# Patient Record
Sex: MALE | Race: WHITE | NOT HISPANIC OR LATINO | Employment: OTHER | URBAN - METROPOLITAN AREA
[De-identification: names, ages, dates, MRNs, and addresses within clinical notes are randomized per-mention and may not be internally consistent; named-entity substitution may affect disease eponyms.]

---

## 2017-01-16 ENCOUNTER — TELEPHONE (OUTPATIENT)
Dept: FAMILY MEDICINE | Facility: CLINIC | Age: 72
End: 2017-01-16

## 2017-01-16 NOTE — TELEPHONE ENCOUNTER
----- Message from Carolyn Gomez sent at 1/16/2017 11:43 AM CST -----  Contact: Ashley Matias (Spouse)  Ashley Matias (Spouse) returning a missed call to reschedule appt. First avail appt late March, but the patient wants to be seen in Feb. Please advise.  Call back .  Thanks!

## 2017-02-08 RX ORDER — TRAMADOL HYDROCHLORIDE 50 MG/1
50 TABLET ORAL EVERY 6 HOURS PRN
Qty: 60 TABLET | Refills: 0 | Status: SHIPPED | OUTPATIENT
Start: 2017-02-08 | End: 2017-02-18

## 2017-02-08 NOTE — TELEPHONE ENCOUNTER
----- Message from Shiela Hickman sent at 2/8/2017  1:09 PM CST -----  Contact: anupama   Back pain, went to ER Sunday   Wants medication refilled   tramidol 50  [facility] [address]  Call back

## 2017-02-22 ENCOUNTER — OFFICE VISIT (OUTPATIENT)
Dept: FAMILY MEDICINE | Facility: CLINIC | Age: 72
End: 2017-02-22
Payer: COMMERCIAL

## 2017-02-22 VITALS
WEIGHT: 173.5 LBS | OXYGEN SATURATION: 99 % | HEIGHT: 72 IN | BODY MASS INDEX: 23.5 KG/M2 | HEART RATE: 62 BPM | SYSTOLIC BLOOD PRESSURE: 132 MMHG | RESPIRATION RATE: 18 BRPM | DIASTOLIC BLOOD PRESSURE: 62 MMHG

## 2017-02-22 DIAGNOSIS — I10 BENIGN HYPERTENSION: ICD-10-CM

## 2017-02-22 DIAGNOSIS — R53.83 FATIGUE, UNSPECIFIED TYPE: ICD-10-CM

## 2017-02-22 DIAGNOSIS — Z12.5 SCREENING FOR PROSTATE CANCER: ICD-10-CM

## 2017-02-22 DIAGNOSIS — Z13.6 SCREENING FOR AAA (ABDOMINAL AORTIC ANEURYSM): ICD-10-CM

## 2017-02-22 DIAGNOSIS — Z11.59 NEED FOR HEPATITIS C SCREENING TEST: ICD-10-CM

## 2017-02-22 DIAGNOSIS — Z12.31 ENCOUNTER FOR SCREENING MAMMOGRAM FOR MALIGNANT NEOPLASM OF BREAST: ICD-10-CM

## 2017-02-22 DIAGNOSIS — E78.00 HYPERCHOLESTEREMIA: Chronic | ICD-10-CM

## 2017-02-22 DIAGNOSIS — R09.89 BRUIT OF LEFT CAROTID ARTERY: ICD-10-CM

## 2017-02-22 DIAGNOSIS — Z00.00 ANNUAL PHYSICAL EXAM: Primary | ICD-10-CM

## 2017-02-22 DIAGNOSIS — Z72.0 TOBACCO ABUSE: Chronic | ICD-10-CM

## 2017-02-22 DIAGNOSIS — N40.0 BENIGN NODULAR PROSTATIC HYPERPLASIA WITHOUT LOWER URINARY TRACT SYMPTOMS: ICD-10-CM

## 2017-02-22 PROCEDURE — 1157F ADVNC CARE PLAN IN RCRD: CPT | Mod: S$GLB,,, | Performed by: INTERNAL MEDICINE

## 2017-02-22 PROCEDURE — 3075F SYST BP GE 130 - 139MM HG: CPT | Mod: S$GLB,,, | Performed by: INTERNAL MEDICINE

## 2017-02-22 PROCEDURE — 1126F AMNT PAIN NOTED NONE PRSNT: CPT | Mod: S$GLB,,, | Performed by: INTERNAL MEDICINE

## 2017-02-22 PROCEDURE — 3078F DIAST BP <80 MM HG: CPT | Mod: S$GLB,,, | Performed by: INTERNAL MEDICINE

## 2017-02-22 PROCEDURE — 99214 OFFICE O/P EST MOD 30 MIN: CPT | Mod: S$GLB,,, | Performed by: INTERNAL MEDICINE

## 2017-02-22 PROCEDURE — 1159F MED LIST DOCD IN RCRD: CPT | Mod: S$GLB,,, | Performed by: INTERNAL MEDICINE

## 2017-02-22 PROCEDURE — 99999 PR PBB SHADOW E&M-EST. PATIENT-LVL IV: CPT | Mod: PBBFAC,,, | Performed by: INTERNAL MEDICINE

## 2017-02-22 PROCEDURE — 1160F RVW MEDS BY RX/DR IN RCRD: CPT | Mod: S$GLB,,, | Performed by: INTERNAL MEDICINE

## 2017-02-22 NOTE — PROGRESS NOTES
"This 71-year-old white male presents today for reevaluation and monitoring of   his medical problems and annual exam.  For continuity, see previous office visit   on 01/15/2016.  During the interim, the patient has had no ER visits or   hospitalizations.  However, he does express rather significant satisfaction as   he stopped "cold turkey" his tobacco use in April 2016 and has not resumed his   disorder.  The patient was counseled again to continue and utilize the behavior   modification and exercise activity at any time craving resumes to prevent use .    At this time, because of his high-risk, a CT of the chest was considered in the   past by then now has accepted by the patient in order to detect any early lesion   or abnormality of the lung.  The patient had ED in the past, utilizes Viagra on   a p.r.n. basis.  No significant problems or complications with the medication.    Hypercholesterolemia, on lipid-lowering agents and is due full comprehensive lab   again and will order along with hepatitis C and is eligible and should have   vascular screening of the aorta and carotid arteries also ordered.  However, no   symptoms related and no unstable angina, CHF or arrhythmia noted and will be due   probably 2017 or 2018 for a repeat heart stress test as he had back in 2014.    No claudication, abdominal pain and no neurovascular symptoms of speech or motor   weakness, syncope, etc.  Immunizations reviewed and all up to date at the   present time.    PHYSICAL EXAMINATION:  GENERAL:  Reveals a well-developed, well-nourished white male in no acute   distress.  VITAL SIGNS:  See vital signs and measurements.  ARTERIES AND VEINS:  Good peripheral pulsation.  Normal carotid upstroke and   amplitude.  There was no edema or phlebitis.  SKIN:  Warm, dry.  No lesions or exanthem.  NODES:  None felt in and around the neck, submandibular, supraclavicular and   axillary region.  NEUROLOGICAL:  The patient is oriented, " cooperative, normal and expected thought   processes and memory and recall grossly noted.  No evidence of depression or   abnormal hyperactive.  Motor tone and strength fairly good and equal   bilaterally.  Cranial nerves grossly intact II-XII.  Gait normal.  No tremor.  HEENT:  Pupils are equal and reactive to light and accommodation.  External eye   movements are intact.  NECK:  No masses or thyroid.  LUNGS:  Clear.  Distant tones.  HEART:  Regular rhythm, 68 beats per minute.  No gallop or arrhythmia.  ABDOMEN:  Soft.  No localized pain, tenderness.  No organs or masses felt at   this time.  Bowel tones were present.  EXTREMITIES:  Again, no arthritis or acute edema, etc.    IMPRESSION:  At this time:  1.  Past history of tobacco use, will continue counseling and he is feeling   better, doing better and will obtain a CT of the chest without contrast to   screen.  2.  Hypercholesterolemia, on lipid-lowering agents and diet and will continue   monitoring and included a lipid screen and his full comprehensive lab.  3.  BPH, stable.  No dysphagia or dyspepsia.    The patient was counseled and reassured regarding his condition and need for a   routine and special testing and will be in touch and be in contact regarding   results and any recommendations as necessary.  Routine visit in six to 12 months   or as needed.          /parmjit [id number] brittni(s)        GRICELDA/DORIS  dd: 02/22/2017 15:12:11 (CST)  td: 02/23/2017 03:20:43 (CST)  Doc ID   #[id number]  Job ID #[id number]    CC:    This office note has been dictated.

## 2017-02-22 NOTE — PATIENT INSTRUCTIONS
Reviewed recent lab,discussed  risk factors, and recommended to continue his wellness program, exercises, diet, meds, and  wt. Control. See dictation.wb.

## 2017-02-24 ENCOUNTER — HOSPITAL ENCOUNTER (OUTPATIENT)
Dept: RADIOLOGY | Facility: HOSPITAL | Age: 72
Discharge: HOME | End: 2017-02-24
Attending: INTERNAL MEDICINE
Payer: COMMERCIAL

## 2017-02-24 DIAGNOSIS — R09.89 BRUIT OF LEFT CAROTID ARTERY: ICD-10-CM

## 2017-02-24 DIAGNOSIS — Z13.6 SCREENING FOR AAA (ABDOMINAL AORTIC ANEURYSM): ICD-10-CM

## 2017-02-24 DIAGNOSIS — Z72.0 TOBACCO ABUSE: Chronic | ICD-10-CM

## 2017-02-24 PROCEDURE — 71250 CT THORAX DX C-: CPT | Mod: 26,,, | Performed by: RADIOLOGY

## 2017-02-24 PROCEDURE — 76775 US EXAM ABDO BACK WALL LIM: CPT | Mod: TC,PO

## 2017-02-24 PROCEDURE — 93880 EXTRACRANIAL BILAT STUDY: CPT | Mod: TC,PO

## 2017-02-24 PROCEDURE — 71250 CT THORAX DX C-: CPT | Mod: TC,PO

## 2017-02-24 PROCEDURE — 76706 US ABDL AORTA SCREEN AAA: CPT | Mod: 26,,, | Performed by: RADIOLOGY

## 2017-02-24 PROCEDURE — 93880 EXTRACRANIAL BILAT STUDY: CPT | Mod: 26,,, | Performed by: RADIOLOGY

## 2017-02-26 ENCOUNTER — TELEPHONE (OUTPATIENT)
Dept: FAMILY MEDICINE | Facility: CLINIC | Age: 72
End: 2017-02-26

## 2017-03-01 ENCOUNTER — TELEPHONE (OUTPATIENT)
Dept: FAMILY MEDICINE | Facility: CLINIC | Age: 72
End: 2017-03-01

## 2017-03-01 RX ORDER — TRAMADOL HYDROCHLORIDE 50 MG/1
50 TABLET ORAL EVERY 6 HOURS PRN
Qty: 90 TABLET | Refills: 3 | Status: SHIPPED | OUTPATIENT
Start: 2017-03-01 | End: 2017-03-11

## 2017-03-01 NOTE — TELEPHONE ENCOUNTER
----- Message from Luz Marina Terrazas sent at 3/1/2017  9:26 AM CST -----  Contact: self  Was seen last Wednesday for annual and asked for a refill on Tramadol, but it has yet to be called in.  Please call back at       [facility] [address] [place] [address] AT [facility] [address]  [address] [address] [address]  Phone: [phone] Fax: [phone]

## 2017-03-02 ENCOUNTER — TELEPHONE (OUTPATIENT)
Dept: FAMILY MEDICINE | Facility: CLINIC | Age: 72
End: 2017-03-02

## 2017-03-02 DIAGNOSIS — E78.01 FAMILIAL HYPERCHOLESTEROLEMIA: ICD-10-CM

## 2017-03-02 DIAGNOSIS — R97.20 ELEVATED PSA: Primary | ICD-10-CM

## 2017-03-02 RX ORDER — ROSUVASTATIN CALCIUM 40 MG/1
40 TABLET, COATED ORAL NIGHTLY
Qty: 90 TABLET | Refills: 3 | Status: SHIPPED | OUTPATIENT
Start: 2017-03-02 | End: 2017-03-29

## 2017-03-02 NOTE — TELEPHONE ENCOUNTER
Spoke to pt. Pt scheduled on 3/29 to see Urology and pt states he has not taken Lipitor in years as it makes his knees shake.

## 2017-03-02 NOTE — TELEPHONE ENCOUNTER
----- Message from Almaz Figueroa sent at 3/2/2017 11:32 AM CST -----  Contact: self [phone]  Patient is returning nurse's phone call.  Please call patient back at [phone].

## 2017-03-02 NOTE — TELEPHONE ENCOUNTER
LAB REVIEWED AND ACCEPTABLE, except his PSA is slightly elevated and would recommend a urological consult and ordered.  Please help and assist with scheduling.  In addition, his cholesterol remains significantly elevated and check and see if he has been taking his Lipitor 40 mg daily give back for possible further recommendations.  RAJNI INGRAM.

## 2017-03-02 NOTE — TELEPHONE ENCOUNTER
Spoke to pt. Pt states he is willing to try something other than the liptior. Please send to [facility] on pt chart

## 2017-03-02 NOTE — TELEPHONE ENCOUNTER
----- Message from Kristina Garcia sent at 3/2/2017  9:09 AM CST -----  Contact: self: [phone]  Patient is returning office call. Please call back with details.

## 2017-03-03 NOTE — TELEPHONE ENCOUNTER
Spoke to patient. Patient states he picked up medication this am. Informed patient Dr. Pastor would like to recheck lipid in 5 months. Patient verbalized understanding.

## 2017-03-05 ENCOUNTER — TELEPHONE (OUTPATIENT)
Dept: FAMILY MEDICINE | Facility: CLINIC | Age: 72
End: 2017-03-05

## 2017-03-05 DIAGNOSIS — R91.1 PULMONARY NODULE, LEFT: Primary | ICD-10-CM

## 2017-03-05 NOTE — TELEPHONE ENCOUNTER
Call the patient and tell him the vascular screens of the neck arteries(carotid) and abdomen(aorta), both show no evidence of blockages, reassure.   The ct of the chest shows a small nodule in the LLL AND WITH HIS HIGH RISK, A REPEAT ORDERED FOR ONE YEAR.PARVEZ

## 2017-03-29 ENCOUNTER — OFFICE VISIT (OUTPATIENT)
Dept: UROLOGY | Facility: CLINIC | Age: 72
End: 2017-03-29
Payer: COMMERCIAL

## 2017-03-29 VITALS
WEIGHT: 172.38 LBS | SYSTOLIC BLOOD PRESSURE: 129 MMHG | BODY MASS INDEX: 23.35 KG/M2 | HEIGHT: 72 IN | HEART RATE: 53 BPM | DIASTOLIC BLOOD PRESSURE: 59 MMHG

## 2017-03-29 DIAGNOSIS — R97.20 ELEVATED PSA: Primary | ICD-10-CM

## 2017-03-29 DIAGNOSIS — N40.0 BPH WITHOUT URINARY OBSTRUCTION: ICD-10-CM

## 2017-03-29 LAB
BILIRUB SERPL-MCNC: NORMAL MG/DL
BLOOD URINE, POC: NORMAL
COLOR, POC UA: YELLOW
GLUCOSE UR QL STRIP: NORMAL
KETONES UR QL STRIP: NORMAL
LEUKOCYTE ESTERASE URINE, POC: NORMAL
NITRITE, POC UA: NORMAL
PH, POC UA: 5
PROTEIN, POC: NORMAL
SPECIFIC GRAVITY, POC UA: 1.02
UROBILINOGEN, POC UA: NORMAL

## 2017-03-29 PROCEDURE — 99204 OFFICE O/P NEW MOD 45 MIN: CPT | Mod: 25,S$GLB,, | Performed by: UROLOGY

## 2017-03-29 PROCEDURE — 99999 PR PBB SHADOW E&M-EST. PATIENT-LVL III: CPT | Mod: PBBFAC,,, | Performed by: UROLOGY

## 2017-03-29 PROCEDURE — 81002 URINALYSIS NONAUTO W/O SCOPE: CPT | Mod: S$GLB,,, | Performed by: UROLOGY

## 2017-03-29 PROCEDURE — 1126F AMNT PAIN NOTED NONE PRSNT: CPT | Mod: S$GLB,,, | Performed by: UROLOGY

## 2017-03-29 PROCEDURE — 3074F SYST BP LT 130 MM HG: CPT | Mod: S$GLB,,, | Performed by: UROLOGY

## 2017-03-29 PROCEDURE — 1159F MED LIST DOCD IN RCRD: CPT | Mod: S$GLB,,, | Performed by: UROLOGY

## 2017-03-29 PROCEDURE — 1160F RVW MEDS BY RX/DR IN RCRD: CPT | Mod: S$GLB,,, | Performed by: UROLOGY

## 2017-03-29 PROCEDURE — 3078F DIAST BP <80 MM HG: CPT | Mod: S$GLB,,, | Performed by: UROLOGY

## 2017-03-29 PROCEDURE — 1157F ADVNC CARE PLAN IN RCRD: CPT | Mod: S$GLB,,, | Performed by: UROLOGY

## 2017-03-29 RX ORDER — TRAMADOL HYDROCHLORIDE 50 MG/1
50 TABLET ORAL EVERY 6 HOURS PRN
COMMUNITY
End: 2017-08-09

## 2017-03-29 NOTE — PROGRESS NOTES
Subjective:       Patient ID: Stas Matias Jr. is a 71 y.o. male.    Chief Complaint: Elevated PSA    HPI     71 year old with elevated PSA.  His PSA is is 4.2.  It has been gradually rising but is decreased from last year when it was 4.5.  His father had prostate cancer.  Thinks he had a prostectomy but details are unknown.  He has no voiding complaints.  He denies hematuria and dysuria.  No previous urinary tract infection.  He quit smoking last year.  No BPH meds.  The natural history of prostate cancer and ongoing controversy regarding screening and potential treatment outcomes of prostate cancer has been discussed with the patient.   His PSA trend and family history are concerning for possible early stage prostate cancer.  We discussed biopsy vs continued observation.    Urine dipstick shows negative for all components.    Component PSA Total PSA, Free PSA, Free Pct   Latest Ref Rng & Units 0.00 - 4.00 ng/mL 0.01 - 1.50 ng/mL Not established %   2/24/2017 1/11/2016 4.5 (H) 1.50 33.33   4/4/2014      3/27/2013      3/21/2012      2/22/2011      2/18/2010      2/6/2009      9/21/2007      9/23/2006      10/19/2004        Component PSA, SCREEN   Latest Ref Rng & Units 0.00 - 4.00 ng/mL   2/24/2017 4.2 (H)   1/11/2016 4/4/2014 2.3   3/27/2013 2.45   3/21/2012 2.15   2/22/2011 1.6   2/18/2010 1.8   2/6/2009 1.6   9/21/2007 1.1   9/23/2006 1.0   10/19/2004 1.2     Past Medical History:   Diagnosis Date    Arthritis     Erectile dysfunction     Hypercholesteremia      History reviewed. No pertinent surgical history.      Current Outpatient Prescriptions:     aspirin 81 mg Tab, Take 1 tablet by mouth Daily., Disp: , Rfl:     multivitamin capsule, Take 1 capsule by mouth once daily., Disp: , Rfl:     sildenafil (VIAGRA) 50 MG tablet, Take 1 tablet by mouth Use as needed.   , Disp: , Rfl:     tramadol (ULTRAM) 50 mg tablet, Take 50 mg by mouth every 6 (six) hours as needed for Pain., Disp: , Rfl:        Review of Systems   Constitutional: Negative for fever.   Eyes: Negative for visual disturbance.   Respiratory: Negative for shortness of breath.    Cardiovascular: Negative for chest pain.   Gastrointestinal: Negative for nausea.   Genitourinary: Negative for dysuria and hematuria.   Musculoskeletal: Negative for gait problem.   Skin: Negative for rash.   Neurological: Negative for seizures.   Psychiatric/Behavioral: Negative for confusion.       Objective:      Physical Exam   Constitutional: He is oriented to person, place, and time. He appears well-developed and well-nourished.   HENT:   Head: Normocephalic and atraumatic.   Eyes: Conjunctivae are normal.   Cardiovascular: Normal rate.    Pulmonary/Chest: Effort normal.   Abdominal: Hernia confirmed negative in the right inguinal area and confirmed negative in the left inguinal area.   Genitourinary: Testes normal and penis normal. Rectal exam shows no mass and anal tone normal. Prostate is enlarged (50g s/s/a). Prostate is not tender.   Musculoskeletal: Normal range of motion. He exhibits no edema.   Neurological: He is alert and oriented to person, place, and time.   Skin: Skin is warm and dry. No rash noted.   Psychiatric: He has a normal mood and affect.   Vitals reviewed.      Assessment:       1. Elevated PSA    2. BPH without urinary obstruction        Plan:       Elevated PSA  -     POCT URINE DIPSTICK WITHOUT MICROSCOPE  -     Prostate Specific Antigen, Diagnostic; Future; Expected date: 9/29/17    BPH without urinary obstruction      Will contnue to observe for now.  F/u 6 months with repeat PSA.

## 2017-03-29 NOTE — LETTER
March 29, 2017      Oh Pastor MD  [address] [facility] [address] [place] [address]           [facility]  [address] [facility] [address] [place] [address]  Phone: [phone]          Patient: Stas Matias Jr.   MR Number: [id number]   YOB: 1945   Date of Visit: 3/29/2017       Dear Dr. Oh Pastor:    Thank you for referring Stas Matias to me for evaluation. Attached you will find relevant portions of my assessment and plan of care.    If you have questions, please do not hesitate to call me. I look forward to following Stas Matias along with you.    Sincerely,    OSITO Mckenzie MD    Enclosure  CC:  No Recipients    If you would like to receive this communication electronically, please contact [email or web address] or [phone] to request more information on [employer] Link access.    For providers and/or their staff who would like to refer a patient to [facility], please contact us through our one-stop-shop provider referral line, [facility], at [phone].    If you feel you have received this communication in error or would no longer like to receive these types of communications, please e-mail [email or web address]

## 2017-04-14 ENCOUNTER — NURSE TRIAGE (OUTPATIENT)
Dept: ADMINISTRATIVE | Facility: CLINIC | Age: 72
End: 2017-04-14

## 2017-04-14 NOTE — TELEPHONE ENCOUNTER
Reason for Disposition   Already left for the hospital/clinic.    Protocols used: ST NO CONTACT OR DUPLICATE CONTACT CALL-A-AH  on way to ER for inablility to urinate    Madyson Barbour RN

## 2017-04-17 DIAGNOSIS — R33.9 URINARY RETENTION: Primary | ICD-10-CM

## 2017-04-17 RX ORDER — TAMSULOSIN HYDROCHLORIDE 0.4 MG/1
0.4 CAPSULE ORAL DAILY
Qty: 30 CAPSULE | Refills: 11 | Status: SHIPPED | OUTPATIENT
Start: 2017-04-17 | End: 2017-04-28 | Stop reason: SDUPTHER

## 2017-04-17 NOTE — TELEPHONE ENCOUNTER
Spoke to pt's wife and booked him for his f/u apt. He had to have a catheter placed in the ER over the weekend. PT had 1.2 L drained from his bladder. I went over the care of his catheter and what to do until we see him. She verbalized understanding. He will need some more flomax to make it until that date. I am sending that request to Dr. Mckenzie.

## 2017-04-17 NOTE — TELEPHONE ENCOUNTER
----- Message from Kelli Mann sent at 4/15/2017  8:51 AM CDT -----  Contact: Patient  Patient wife called requesting a appointment for next week. Due to ER visit. Patient couldn't urinate. Please call back at [phone] to confirm. Thanks,

## 2017-04-28 ENCOUNTER — OFFICE VISIT (OUTPATIENT)
Dept: UROLOGY | Facility: CLINIC | Age: 72
End: 2017-04-28
Payer: COMMERCIAL

## 2017-04-28 VITALS
HEIGHT: 72 IN | DIASTOLIC BLOOD PRESSURE: 57 MMHG | HEART RATE: 52 BPM | BODY MASS INDEX: 23.59 KG/M2 | SYSTOLIC BLOOD PRESSURE: 116 MMHG | WEIGHT: 174.19 LBS

## 2017-04-28 DIAGNOSIS — R97.20 ELEVATED PSA: ICD-10-CM

## 2017-04-28 DIAGNOSIS — N13.8 BPH (BENIGN PROSTATIC HYPERTROPHY) WITH URINARY OBSTRUCTION: Primary | ICD-10-CM

## 2017-04-28 DIAGNOSIS — N40.1 BPH (BENIGN PROSTATIC HYPERTROPHY) WITH URINARY OBSTRUCTION: Primary | ICD-10-CM

## 2017-04-28 DIAGNOSIS — R33.9 URINARY RETENTION: ICD-10-CM

## 2017-04-28 PROCEDURE — 3074F SYST BP LT 130 MM HG: CPT | Mod: S$GLB,,, | Performed by: UROLOGY

## 2017-04-28 PROCEDURE — 1159F MED LIST DOCD IN RCRD: CPT | Mod: S$GLB,,, | Performed by: UROLOGY

## 2017-04-28 PROCEDURE — 3078F DIAST BP <80 MM HG: CPT | Mod: S$GLB,,, | Performed by: UROLOGY

## 2017-04-28 PROCEDURE — 1157F ADVNC CARE PLAN IN RCRD: CPT | Mod: 8P,S$GLB,, | Performed by: UROLOGY

## 2017-04-28 PROCEDURE — 99214 OFFICE O/P EST MOD 30 MIN: CPT | Mod: S$GLB,,, | Performed by: UROLOGY

## 2017-04-28 PROCEDURE — 1160F RVW MEDS BY RX/DR IN RCRD: CPT | Mod: S$GLB,,, | Performed by: UROLOGY

## 2017-04-28 PROCEDURE — 99999 PR PBB SHADOW E&M-EST. PATIENT-LVL III: CPT | Mod: PBBFAC,,, | Performed by: UROLOGY

## 2017-04-28 PROCEDURE — 1126F AMNT PAIN NOTED NONE PRSNT: CPT | Mod: S$GLB,,, | Performed by: UROLOGY

## 2017-04-28 RX ORDER — TAMSULOSIN HYDROCHLORIDE 0.4 MG/1
0.4 CAPSULE ORAL DAILY
Qty: 30 CAPSULE | Refills: 11 | Status: SHIPPED | OUTPATIENT
Start: 2017-04-28 | End: 2017-05-17 | Stop reason: SDUPTHER

## 2017-04-28 NOTE — PROGRESS NOTES
Subjective:       Patient ID: Stas Matias Jr. is a 71 y.o. male.    Chief Complaint: Urinary Retention    HPI     71 year old seen last month for elevated PSA.  He was doing well at that time and had no voiding complaints.  He travelled to [place] 2 weeks ago and while there noticed worsening urinary frequency.  He could no completely empty his bladder and he was seen in ER for urinary retention.  Catheter placed and drained a liter of urine.  He denies any recent medicine changes.  No OTC cold meds.  He denies hematuria and dysuria.  He was given Flomax in the ER.  Catheter now in place 2 weeks.    Review of Systems   Constitutional: Negative for fever.   Genitourinary: Negative for hematuria.   Skin: Negative for rash.       Objective:      Physical Exam   Constitutional: He is oriented to person, place, and time. He appears well-developed and well-nourished.   HENT:   Head: Normocephalic and atraumatic.   Eyes: Conjunctivae are normal.   Cardiovascular: Normal rate.    Pulmonary/Chest: Effort normal.   Abdominal: Soft.   Genitourinary:   Genitourinary Comments: Dawson in place.  Urine is clear   Musculoskeletal: Normal range of motion.   Neurological: He is alert and oriented to person, place, and time.   Skin: Skin is warm and dry. No rash noted.   Psychiatric: He has a normal mood and affect.   Vitals reviewed.      Assessment:       1. BPH (benign prostatic hypertrophy) with urinary obstruction    2. Urinary retention    3. Elevated PSA        Plan:       BPH (benign prostatic hypertrophy) with urinary obstruction    Urinary retention  -     tamsulosin (FLOMAX) 0.4 mg Cp24; Take 1 capsule (0.4 mg total) by mouth once daily.  Dispense: 30 capsule; Refill: 11    Elevated PSA        Continue Flomax.  Dawson out today.  RTC next week for PVR

## 2017-05-01 ENCOUNTER — OFFICE VISIT (OUTPATIENT)
Dept: UROLOGY | Facility: CLINIC | Age: 72
End: 2017-05-01
Payer: COMMERCIAL

## 2017-05-01 ENCOUNTER — TELEPHONE (OUTPATIENT)
Dept: UROLOGY | Facility: CLINIC | Age: 72
End: 2017-05-01

## 2017-05-01 VITALS
SYSTOLIC BLOOD PRESSURE: 131 MMHG | WEIGHT: 174.19 LBS | HEART RATE: 75 BPM | DIASTOLIC BLOOD PRESSURE: 68 MMHG | HEIGHT: 72 IN | BODY MASS INDEX: 23.59 KG/M2 | RESPIRATION RATE: 20 BRPM

## 2017-05-01 DIAGNOSIS — N40.1 BPH (BENIGN PROSTATIC HYPERTROPHY) WITH URINARY OBSTRUCTION: ICD-10-CM

## 2017-05-01 DIAGNOSIS — R33.9 URINARY RETENTION: Primary | ICD-10-CM

## 2017-05-01 DIAGNOSIS — N13.8 BPH (BENIGN PROSTATIC HYPERTROPHY) WITH URINARY OBSTRUCTION: ICD-10-CM

## 2017-05-01 LAB — POC RESIDUAL URINE VOLUME: 479 ML (ref 0–100)

## 2017-05-01 PROCEDURE — 1159F MED LIST DOCD IN RCRD: CPT | Mod: S$GLB,,, | Performed by: UROLOGY

## 2017-05-01 PROCEDURE — 99213 OFFICE O/P EST LOW 20 MIN: CPT | Mod: S$GLB,,, | Performed by: UROLOGY

## 2017-05-01 PROCEDURE — 51798 US URINE CAPACITY MEASURE: CPT | Mod: S$GLB,,, | Performed by: UROLOGY

## 2017-05-01 PROCEDURE — 1126F AMNT PAIN NOTED NONE PRSNT: CPT | Mod: S$GLB,,, | Performed by: UROLOGY

## 2017-05-01 PROCEDURE — 3078F DIAST BP <80 MM HG: CPT | Mod: S$GLB,,, | Performed by: UROLOGY

## 2017-05-01 PROCEDURE — 1157F ADVNC CARE PLAN IN RCRD: CPT | Mod: 8P,S$GLB,, | Performed by: UROLOGY

## 2017-05-01 PROCEDURE — 3075F SYST BP GE 130 - 139MM HG: CPT | Mod: S$GLB,,, | Performed by: UROLOGY

## 2017-05-01 PROCEDURE — 99999 PR PBB SHADOW E&M-EST. PATIENT-LVL III: CPT | Mod: PBBFAC,,, | Performed by: UROLOGY

## 2017-05-01 PROCEDURE — 1160F RVW MEDS BY RX/DR IN RCRD: CPT | Mod: S$GLB,,, | Performed by: UROLOGY

## 2017-05-01 NOTE — MR AVS SNAPSHOT
[facility]  [address]  [facility] [address]  Phone: [phone]                  Stas Matias JrMali   2017 10:45 AM   Office Visit    Description:  Male : 1945   Provider:  OSITO Mckenzie MD   Department:  [facility]           Reason for Visit     Benign Prostatic Hypertrophy                To Do List           Future Appointments        Provider Department Dept Phone    5/3/2017 11:15 AM OSITO Mckenzie MD [facility] [phone]      Goals (5 Years of Data)     None      [facility]     [facility] Care [facility] -  Assistance  Unless otherwise directed by your provider, please contact [facility], our nurse care line that is available for  assistance.     Registered nurses in the [facility] Center provide: appointment scheduling, clinical advisement, health education, and other advisory services.  Call: [phone] (toll free)               Medications           Message regarding Medications     Verify the changes and/or additions to your medication regime listed below are the same as discussed with your clinician today.  If any of these changes or additions are incorrect, please notify your healthcare provider.             Verify that the below list of medications is an accurate representation of the medications you are currently taking.  If none reported, the list may be blank. If incorrect, please contact your healthcare provider. Carry this list with you in case of emergency.           Current Medications     acetaminophen (TYLENOL) 500 MG tablet Take 2 tablets (1,000 mg total) by mouth 3 (three) times daily as needed for Pain.    aspirin 81 mg Tab Take 1 tablet by mouth Daily.    multivitamin capsule Take 1 capsule by mouth once daily.    sildenafil (VIAGRA) 50 MG tablet Take 1 tablet by mouth Use as needed.       tamsulosin (FLOMAX) 0.4 mg Cp24 Take 1 capsule (0.4 mg total) by mouth once daily.    tramadol (ULTRAM) 50  mg tablet Take 50 mg by mouth every 6 (six) hours as needed for Pain.           Clinical Reference Information           Your Vitals Were     BP Pulse Resp Height Weight BMI    131/68 75 20 6' (1.829 m) 79 kg (174 lb 2.6 oz) 23.62 kg/m2      Blood Pressure          Most Recent Value    BP  131/68      Allergies as of 5/1/2017     No Known Drug Allergies      Immunizations Administered on Date of Encounter - 5/1/2017     None      Smoking Cessation     If you would like to quit smoking:   You may be eligible for free services if you are a [place] resident and started smoking cigarettes before September 1, 1988.  Call the Smoking Cessation Trust ([facility]) toll free at ([phone] 056-9207 or ([phone] 607-5955.   Call 1-800-QUIT-NOW if you do not meet the above criteria.   Contact us via email: [email or web address]   View our website for more information: [email or web address]        Language Assistance Services     ATTENTION: Language assistance services are available, free of charge. Please call [phone].      ATENCIÓN: Si habla español, tiene a hartley disposición servicios gratuitos de asistencia lingüística. Llame al [phone].     GUILLERMO Ý: N?u b?n nói Ti?ng Vi?t, có các d?ch v? h? tr? ngôn ng? mi?n phí dành cho b?n. G?i s? [phone].         [facility] complies with applicable Federal civil rights laws and does not discriminate on the basis of race, color, national origin, age, disability, or sex.

## 2017-05-01 NOTE — TELEPHONE ENCOUNTER
----- Message from Shania Figueroa sent at 5/1/2017  8:12 AM CDT -----  Contact: Ashley Matias (Spouse)  Pt was seen on April 28, Pt is having trouble urinating, would like to discuss with nurse  Call back on # [phone]   thanks

## 2017-05-01 NOTE — PROGRESS NOTES
Subjective:       Patient ID: Stas Matias Jr. is a 71 y.o. male.    Chief Complaint: Benign Prostatic Hypertrophy    HPI     71 year old seen last month for elevated PSA. He has since developed urinary retention.  Catheter placed and drained a liter of urine.  The sauer was removed 3 days ago.  He says he is doing well.  He is voiding.  He denies lower abdominal pain.  He started Flomax only 2 weeks ago.  He denies hematuria and dysuria.  post void residual 479 ml    Review of Systems   Constitutional: Negative for fever.   Genitourinary: Negative for dysuria and hematuria.       Objective:      Physical Exam   Constitutional: He is oriented to person, place, and time. He appears well-developed and well-nourished.   Pulmonary/Chest: Effort normal.   Abdominal: Soft.   Neurological: He is alert and oriented to person, place, and time.   Skin: No rash noted.   Psychiatric: He has a normal mood and affect.   Vitals reviewed.      Assessment:       1. Urinary retention    2. BPH (benign prostatic hypertrophy) with urinary obstruction        Plan:       Urinary retention  -     POCT Bladder Scan; Future; Expected date: 5/1/17    BPH (benign prostatic hypertrophy) with urinary obstruction        Continue Flomax.  F/u later this week with repeat PVR.

## 2017-05-01 NOTE — TELEPHONE ENCOUNTER
Spoke to pt and booked him an apt for today to see dr heath. Pt is possibly in [facility] retention.

## 2017-05-05 ENCOUNTER — OFFICE VISIT (OUTPATIENT)
Dept: UROLOGY | Facility: CLINIC | Age: 72
End: 2017-05-05
Payer: COMMERCIAL

## 2017-05-05 VITALS
DIASTOLIC BLOOD PRESSURE: 61 MMHG | HEART RATE: 63 BPM | SYSTOLIC BLOOD PRESSURE: 111 MMHG | BODY MASS INDEX: 23.38 KG/M2 | HEIGHT: 72 IN | WEIGHT: 172.63 LBS

## 2017-05-05 DIAGNOSIS — N13.8 BPH (BENIGN PROSTATIC HYPERTROPHY) WITH URINARY OBSTRUCTION: Primary | ICD-10-CM

## 2017-05-05 DIAGNOSIS — R33.9 URINARY RETENTION: ICD-10-CM

## 2017-05-05 DIAGNOSIS — N40.1 BPH (BENIGN PROSTATIC HYPERTROPHY) WITH URINARY OBSTRUCTION: Primary | ICD-10-CM

## 2017-05-05 PROCEDURE — 1160F RVW MEDS BY RX/DR IN RCRD: CPT | Mod: S$GLB,,, | Performed by: UROLOGY

## 2017-05-05 PROCEDURE — 99999 PR PBB SHADOW E&M-EST. PATIENT-LVL III: CPT | Mod: PBBFAC,,, | Performed by: UROLOGY

## 2017-05-05 PROCEDURE — 99213 OFFICE O/P EST LOW 20 MIN: CPT | Mod: S$GLB,,, | Performed by: UROLOGY

## 2017-05-05 PROCEDURE — 1157F ADVNC CARE PLAN IN RCRD: CPT | Mod: 8P,S$GLB,, | Performed by: UROLOGY

## 2017-05-05 PROCEDURE — 1126F AMNT PAIN NOTED NONE PRSNT: CPT | Mod: S$GLB,,, | Performed by: UROLOGY

## 2017-05-05 PROCEDURE — 3078F DIAST BP <80 MM HG: CPT | Mod: S$GLB,,, | Performed by: UROLOGY

## 2017-05-05 PROCEDURE — 1159F MED LIST DOCD IN RCRD: CPT | Mod: S$GLB,,, | Performed by: UROLOGY

## 2017-05-05 PROCEDURE — 3074F SYST BP LT 130 MM HG: CPT | Mod: S$GLB,,, | Performed by: UROLOGY

## 2017-05-05 NOTE — PROGRESS NOTES
Subjective:       Patient ID: Stas Matias Jr. is a 71 y.o. male.    Chief Complaint: Urinary Retention    HPI     71 year old seen last month for elevated PSA. He then developed urinary retention. Catheter placed and drained a liter of urine. The sauer was removed 3 days ago. He says he is doing well.  He is voiding better  PVR initially was 479.  He increased Flomax to BID and now PVR decreased to 82ml.  He denies hematuria and dysuria.  We discussed surgical treatment options and he will consider.      Review of Systems   Constitutional: Negative for fever.   Genitourinary: Negative for dysuria and hematuria.       Objective:      Physical Exam   Constitutional: He is oriented to person, place, and time. He appears well-developed and well-nourished.   Pulmonary/Chest: Effort normal.   Abdominal: Soft.   Neurological: He is alert and oriented to person, place, and time.   Skin: No rash noted.   Psychiatric: He has a normal mood and affect.   Vitals reviewed.      Assessment:       1. BPH (benign prostatic hypertrophy) with urinary obstruction    2. Urinary retention        Plan:       BPH (benign prostatic hypertrophy) with urinary obstruction    Urinary retention      Continue Flomax for now.  RTC 3 months.  Will recommend TURP if any more problems.

## 2017-05-09 ENCOUNTER — TELEPHONE (OUTPATIENT)
Dept: FAMILY MEDICINE | Facility: CLINIC | Age: 72
End: 2017-05-09

## 2017-05-09 RX ORDER — FLUTICASONE PROPIONATE 50 MCG
2 SPRAY, SUSPENSION (ML) NASAL DAILY
Qty: 16 G | Refills: 11 | Status: SHIPPED | OUTPATIENT
Start: 2017-05-09 | End: 2017-08-09

## 2017-05-17 DIAGNOSIS — R33.9 URINARY RETENTION: ICD-10-CM

## 2017-05-17 RX ORDER — TAMSULOSIN HYDROCHLORIDE 0.4 MG/1
0.8 CAPSULE ORAL DAILY
Qty: 60 CAPSULE | Refills: 11 | Status: SHIPPED | OUTPATIENT
Start: 2017-05-17 | End: 2017-05-22 | Stop reason: SDUPTHER

## 2017-05-17 NOTE — TELEPHONE ENCOUNTER
----- Message from Shiela Hickman sent at 5/17/2017  7:38 AM CDT -----  Contact: wife,   Needs new RX  Told to double up on med   tamsulosin (FLOMAX) 0.4 mg Cp24  .  [facility] [address] [place], [place] [address] [place] [address] AT [facility] [address]  [address] [address] [address]  Phone: [phone] Fax: [phone]     Call back

## 2017-05-22 DIAGNOSIS — R33.9 URINARY RETENTION: ICD-10-CM

## 2017-05-22 RX ORDER — TAMSULOSIN HYDROCHLORIDE 0.4 MG/1
0.8 CAPSULE ORAL DAILY
Qty: 180 CAPSULE | Refills: 3 | Status: SHIPPED | OUTPATIENT
Start: 2017-05-22 | End: 2018-05-10 | Stop reason: SDUPTHER

## 2017-05-22 NOTE — TELEPHONE ENCOUNTER
----- Message from Silvia Mc sent at 5/20/2017 10:59 AM CDT -----  Contact: Ashley  Patient's calling regarding Rx Flomax and     [facility] [address] [place] [address] AT [facility] [address]  [address] [address] [address]  Phone: [phone] Fax: [phone]     is telling her this Rx needs to be on a 90-day supply. Out of medication. Please call [phone]. Thanks!

## 2017-08-09 ENCOUNTER — LAB VISIT (OUTPATIENT)
Dept: LAB | Facility: HOSPITAL | Age: 72
End: 2017-08-09
Attending: UROLOGY
Payer: COMMERCIAL

## 2017-08-09 ENCOUNTER — OFFICE VISIT (OUTPATIENT)
Dept: UROLOGY | Facility: CLINIC | Age: 72
End: 2017-08-09
Payer: COMMERCIAL

## 2017-08-09 VITALS
DIASTOLIC BLOOD PRESSURE: 58 MMHG | HEART RATE: 46 BPM | WEIGHT: 171.94 LBS | BODY MASS INDEX: 23.29 KG/M2 | HEIGHT: 72 IN | SYSTOLIC BLOOD PRESSURE: 137 MMHG

## 2017-08-09 DIAGNOSIS — R97.20 ELEVATED PSA: ICD-10-CM

## 2017-08-09 DIAGNOSIS — N40.0 BENIGN PROSTATIC HYPERPLASIA, PRESENCE OF LOWER URINARY TRACT SYMPTOMS UNSPECIFIED: Primary | ICD-10-CM

## 2017-08-09 LAB
BILIRUB SERPL-MCNC: NORMAL MG/DL
BLOOD URINE, POC: NORMAL
COLOR, POC UA: YELLOW
COMPLEXED PSA SERPL-MCNC: 7.9 NG/ML
GLUCOSE UR QL STRIP: NORMAL
KETONES UR QL STRIP: NORMAL
LEUKOCYTE ESTERASE URINE, POC: NORMAL
NITRITE, POC UA: NORMAL
PH, POC UA: 5
POC RESIDUAL URINE VOLUME: 188 ML (ref 0–100)
PROTEIN, POC: NORMAL
SPECIFIC GRAVITY, POC UA: 1.02
UROBILINOGEN, POC UA: NORMAL

## 2017-08-09 PROCEDURE — 99214 OFFICE O/P EST MOD 30 MIN: CPT | Mod: 25,S$GLB,, | Performed by: UROLOGY

## 2017-08-09 PROCEDURE — 99999 PR PBB SHADOW E&M-EST. PATIENT-LVL III: CPT | Mod: PBBFAC,,, | Performed by: UROLOGY

## 2017-08-09 PROCEDURE — 1159F MED LIST DOCD IN RCRD: CPT | Mod: S$GLB,,, | Performed by: UROLOGY

## 2017-08-09 PROCEDURE — 51798 US URINE CAPACITY MEASURE: CPT | Mod: S$GLB,,, | Performed by: UROLOGY

## 2017-08-09 PROCEDURE — 81002 URINALYSIS NONAUTO W/O SCOPE: CPT | Mod: S$GLB,,, | Performed by: UROLOGY

## 2017-08-09 PROCEDURE — 36415 COLL VENOUS BLD VENIPUNCTURE: CPT | Mod: PO

## 2017-08-09 PROCEDURE — 1126F AMNT PAIN NOTED NONE PRSNT: CPT | Mod: S$GLB,,, | Performed by: UROLOGY

## 2017-08-09 PROCEDURE — 3075F SYST BP GE 130 - 139MM HG: CPT | Mod: S$GLB,,, | Performed by: UROLOGY

## 2017-08-09 PROCEDURE — 3078F DIAST BP <80 MM HG: CPT | Mod: S$GLB,,, | Performed by: UROLOGY

## 2017-08-09 PROCEDURE — 84153 ASSAY OF PSA TOTAL: CPT

## 2017-08-09 NOTE — PROGRESS NOTES
Subjective:       Patient ID: Stas Matias Jr. is a 71 y.o. male.    Chief Complaint: Follow-up    HPI   71 year old seen 6 months ago for elevated PSA.  He then developed urinary retention.  Catheter placed and drained a liter of urine. The sauer was removed 3 months ago. He says he is doing well.  He is voiding better  PVR initially was 479.  He is taking Flomax to daily.  PVR is decreased to 188 ml.  He denies hematuria and dysuria.  We previously discussed surgical treatment options but he is overall satisfied for now.  Urine dipstick shows negative for all components.      Component PSA, SCREEN   Latest Ref Rng & Units 0.00 - 4.00 ng/mL   2/24/2017 4.2 (H)   1/11/2016 4.5   4/4/2014 2.3   3/27/2013 2.45   3/21/2012 2.15   2/22/2011 1.6   2/18/2010 1.8   2/6/2009 1.6   9/21/2007 1.1   9/23/2006 1.0   10/19/2004 1.2       Review of Systems   Constitutional: Negative for fever.   Genitourinary: Negative for dysuria and hematuria.       Objective:      Physical Exam   Constitutional: He is oriented to person, place, and time. He appears well-developed and well-nourished.   HENT:   Head: Normocephalic and atraumatic.   Eyes: Conjunctivae are normal.   Cardiovascular: Normal rate.    Pulmonary/Chest: Effort normal.   Abdominal: Soft. He exhibits no distension and no mass. There is no tenderness.   Musculoskeletal: Normal range of motion. He exhibits no edema.   Neurological: He is alert and oriented to person, place, and time.   Skin: Skin is warm and dry. No rash noted.   Psychiatric: He has a normal mood and affect.   Vitals reviewed.      Assessment:       1. Benign prostatic hyperplasia, presence of lower urinary tract symptoms unspecified    2. Elevated PSA        Plan:       Benign prostatic hyperplasia, presence of lower urinary tract symptoms unspecified  -     POCT URINE DIPSTICK WITHOUT MICROSCOPE  -     POCT Bladder Scan    Elevated PSA      Get PSA.  RTC 6 months

## 2017-08-11 ENCOUNTER — TELEPHONE (OUTPATIENT)
Dept: UROLOGY | Facility: CLINIC | Age: 72
End: 2017-08-11

## 2017-08-11 DIAGNOSIS — R97.20 ELEVATED PSA: Primary | ICD-10-CM

## 2017-08-11 RX ORDER — LEVOFLOXACIN 500 MG/1
500 TABLET, FILM COATED ORAL DAILY
Qty: 3 TABLET | Refills: 0 | Status: SHIPPED | OUTPATIENT
Start: 2017-08-28 | End: 2017-08-31

## 2017-08-11 NOTE — TELEPHONE ENCOUNTER
----- Message from OSITO Mckenzie MD sent at 8/10/2017  6:45 PM CDT -----  Call with PSA.  Increased/Doubled.  I recommend prostate needle biopsy.  Ok to schedule for f/u to discuss.

## 2017-08-11 NOTE — TELEPHONE ENCOUNTER
Spoke to pt and he would like to go ahead and be set up for a prostate biopsy. I have him scheduled for 8/29/17

## 2017-08-25 ENCOUNTER — TELEPHONE (OUTPATIENT)
Dept: UROLOGY | Facility: CLINIC | Age: 72
End: 2017-08-25

## 2017-08-25 NOTE — TELEPHONE ENCOUNTER
Spoke to pt and informed him that the levaquin was sent to the pharmacy, if it is not there he will call me back.

## 2017-08-25 NOTE — TELEPHONE ENCOUNTER
----- Message from Yomaira Curtis sent at 8/25/2017 12:07 PM CDT -----  needs callback rg surgery kit..[phone] (home)

## 2017-08-25 NOTE — TELEPHONE ENCOUNTER
----- Message from Silvia Bach sent at 8/25/2017  1:43 PM CDT -----  Contact: self                    attn:  Josh  Patient [phone] is calling back to advise Josh that the pharmacy does not have any prescription for him/please send to pharmacy again and advise patient

## 2017-08-28 ENCOUNTER — ANESTHESIA EVENT (OUTPATIENT)
Dept: SURGERY | Facility: HOSPITAL | Age: 72
End: 2017-08-28
Payer: COMMERCIAL

## 2017-08-29 ENCOUNTER — ANESTHESIA (OUTPATIENT)
Dept: SURGERY | Facility: HOSPITAL | Age: 72
End: 2017-08-29
Payer: COMMERCIAL

## 2017-08-29 ENCOUNTER — HOSPITAL ENCOUNTER (OUTPATIENT)
Facility: HOSPITAL | Age: 72
Discharge: HOME | End: 2017-08-29
Attending: UROLOGY | Admitting: UROLOGY
Payer: COMMERCIAL

## 2017-08-29 DIAGNOSIS — R97.20 ELEVATED PSA: Primary | ICD-10-CM

## 2017-08-29 PROCEDURE — 76942 ECHO GUIDE FOR BIOPSY: CPT | Mod: 26,59,, | Performed by: UROLOGY

## 2017-08-29 PROCEDURE — D9220A PRA ANESTHESIA: Mod: CRNA,,, | Performed by: NURSE ANESTHETIST, CERTIFIED REGISTERED

## 2017-08-29 PROCEDURE — 37000009 HC ANESTHESIA EA ADD 15 MINS: Mod: PO | Performed by: UROLOGY

## 2017-08-29 PROCEDURE — 76872 US TRANSRECTAL: CPT | Mod: 26,,, | Performed by: UROLOGY

## 2017-08-29 PROCEDURE — 36000705 HC OR TIME LEV I EA ADD 15 MIN: Mod: PO | Performed by: UROLOGY

## 2017-08-29 PROCEDURE — 71000033 HC RECOVERY, INTIAL HOUR: Mod: PO | Performed by: UROLOGY

## 2017-08-29 PROCEDURE — 55700 PR BIOPSY OF PROSTATE,NEEDLE/PUNCH: CPT | Mod: ,,, | Performed by: UROLOGY

## 2017-08-29 PROCEDURE — D9220A PRA ANESTHESIA: Mod: ANES,,, | Performed by: ANESTHESIOLOGY

## 2017-08-29 PROCEDURE — 37000008 HC ANESTHESIA 1ST 15 MINUTES: Mod: PO | Performed by: UROLOGY

## 2017-08-29 PROCEDURE — 88305 TISSUE EXAM BY PATHOLOGIST: CPT | Performed by: PATHOLOGY

## 2017-08-29 PROCEDURE — 63600175 PHARM REV CODE 636 W HCPCS: Mod: PO | Performed by: UROLOGY

## 2017-08-29 PROCEDURE — 25000003 PHARM REV CODE 250: Mod: PO | Performed by: UROLOGY

## 2017-08-29 PROCEDURE — 63600175 PHARM REV CODE 636 W HCPCS: Mod: PO | Performed by: NURSE ANESTHETIST, CERTIFIED REGISTERED

## 2017-08-29 PROCEDURE — 36000704 HC OR TIME LEV I 1ST 15 MIN: Mod: PO | Performed by: UROLOGY

## 2017-08-29 PROCEDURE — 25000003 PHARM REV CODE 250: Mod: PO | Performed by: ANESTHESIOLOGY

## 2017-08-29 RX ORDER — FENTANYL CITRATE 50 UG/ML
25 INJECTION, SOLUTION INTRAMUSCULAR; INTRAVENOUS EVERY 5 MIN PRN
Status: CANCELLED | OUTPATIENT
Start: 2017-08-29

## 2017-08-29 RX ORDER — FENTANYL CITRATE 50 UG/ML
INJECTION, SOLUTION INTRAMUSCULAR; INTRAVENOUS
Status: DISCONTINUED | OUTPATIENT
Start: 2017-08-29 | End: 2017-08-29

## 2017-08-29 RX ORDER — LIDOCAINE HYDROCHLORIDE 10 MG/ML
1 INJECTION, SOLUTION EPIDURAL; INFILTRATION; INTRACAUDAL; PERINEURAL ONCE
Status: DISCONTINUED | OUTPATIENT
Start: 2017-08-29 | End: 2017-08-29 | Stop reason: HOSPADM

## 2017-08-29 RX ORDER — SODIUM CHLORIDE, SODIUM LACTATE, POTASSIUM CHLORIDE, CALCIUM CHLORIDE 600; 310; 30; 20 MG/100ML; MG/100ML; MG/100ML; MG/100ML
INJECTION, SOLUTION INTRAVENOUS CONTINUOUS
Status: DISCONTINUED | OUTPATIENT
Start: 2017-08-29 | End: 2017-08-29 | Stop reason: HOSPADM

## 2017-08-29 RX ORDER — PROPOFOL 10 MG/ML
VIAL (ML) INTRAVENOUS
Status: DISCONTINUED | OUTPATIENT
Start: 2017-08-29 | End: 2017-08-29

## 2017-08-29 RX ORDER — LIDOCAINE HCL/PF 100 MG/5ML
SYRINGE (ML) INTRAVENOUS
Status: DISCONTINUED | OUTPATIENT
Start: 2017-08-29 | End: 2017-08-29

## 2017-08-29 RX ORDER — PROPOFOL 10 MG/ML
VIAL (ML) INTRAVENOUS CONTINUOUS PRN
Status: DISCONTINUED | OUTPATIENT
Start: 2017-08-29 | End: 2017-08-29

## 2017-08-29 RX ORDER — LIDOCAINE HYDROCHLORIDE 10 MG/ML
INJECTION, SOLUTION EPIDURAL; INFILTRATION; INTRACAUDAL; PERINEURAL
Status: DISCONTINUED | OUTPATIENT
Start: 2017-08-29 | End: 2017-08-29 | Stop reason: HOSPADM

## 2017-08-29 RX ORDER — SODIUM CHLORIDE 9 MG/ML
INJECTION, SOLUTION INTRAVENOUS CONTINUOUS
Status: DISCONTINUED | OUTPATIENT
Start: 2017-08-29 | End: 2017-08-29

## 2017-08-29 RX ORDER — METOCLOPRAMIDE HYDROCHLORIDE 5 MG/ML
10 INJECTION INTRAMUSCULAR; INTRAVENOUS EVERY 10 MIN PRN
Status: CANCELLED | OUTPATIENT
Start: 2017-08-29

## 2017-08-29 RX ORDER — LIDOCAINE HYDROCHLORIDE 10 MG/ML
1 INJECTION, SOLUTION EPIDURAL; INFILTRATION; INTRACAUDAL; PERINEURAL ONCE
Status: DISCONTINUED | OUTPATIENT
Start: 2017-08-29 | End: 2017-08-29

## 2017-08-29 RX ORDER — OXYCODONE HYDROCHLORIDE 5 MG/1
5 TABLET ORAL
Status: CANCELLED | OUTPATIENT
Start: 2017-08-29

## 2017-08-29 RX ORDER — SODIUM CHLORIDE 0.9 % (FLUSH) 0.9 %
3 SYRINGE (ML) INJECTION
Status: DISCONTINUED | OUTPATIENT
Start: 2017-08-29 | End: 2017-08-29 | Stop reason: HOSPADM

## 2017-08-29 RX ORDER — MIDAZOLAM HYDROCHLORIDE 1 MG/ML
INJECTION, SOLUTION INTRAMUSCULAR; INTRAVENOUS
Status: DISCONTINUED | OUTPATIENT
Start: 2017-08-29 | End: 2017-08-29

## 2017-08-29 RX ADMIN — LIDOCAINE HYDROCHLORIDE 100 MG: 20 INJECTION PARENTERAL at 08:08

## 2017-08-29 RX ADMIN — PROPOFOL 100 MG: 10 INJECTION, EMULSION INTRAVENOUS at 08:08

## 2017-08-29 RX ADMIN — SODIUM CHLORIDE, SODIUM LACTATE, POTASSIUM CHLORIDE, AND CALCIUM CHLORIDE: .6; .31; .03; .02 INJECTION, SOLUTION INTRAVENOUS at 08:08

## 2017-08-29 RX ADMIN — MIDAZOLAM HYDROCHLORIDE 2 MG: 1 INJECTION, SOLUTION INTRAMUSCULAR; INTRAVENOUS at 08:08

## 2017-08-29 RX ADMIN — PROPOFOL 150 MCG/KG/MIN: 10 INJECTION, EMULSION INTRAVENOUS at 08:08

## 2017-08-29 RX ADMIN — GENTAMICIN SULFATE 80 MG: 40 INJECTION, SOLUTION INTRAMUSCULAR; INTRAVENOUS at 08:08

## 2017-08-29 RX ADMIN — FENTANYL CITRATE 25 MCG: 50 INJECTION, SOLUTION INTRAMUSCULAR; INTRAVENOUS at 08:08

## 2017-08-29 NOTE — ANESTHESIA PREPROCEDURE EVALUATION
08/29/2017  Stas Matias Jr. is a 72 y.o., male.    Anesthesia Evaluation    I have reviewed the Patient Summary Reports.    I have reviewed the Nursing Notes.   I have reviewed the Medications.     Review of Systems  Anesthesia Hx:  Denies Family Hx of Anesthesia complications.   Denies Personal Hx of Anesthesia complications.   Social:  Former Smoker    Cardiovascular:  Cardiovascular Normal     Pulmonary:  Pulmonary Normal    Renal/:  Renal/ Normal     Neurological:  Neurology Normal    Endocrine:  Endocrine Normal        Physical Exam  General:  Well nourished    Airway/Jaw/Neck:  Airway Findings: Mouth Opening: Normal Tongue: Normal  General Airway Assessment: Adult  Mallampati: III  Improves to II with phonation.  TM Distance: Normal, at least 6 cm  Jaw/Neck Findings:  Neck ROM: Normal ROM      Dental:  Dental Findings: Upper Dentures, Lower Dentures   Chest/Lungs:  Chest/Lungs Clear    Heart/Vascular:  Heart Findings: Normal            Anesthesia Plan  Type of Anesthesia, risks & benefits discussed:  Anesthesia Type:  general, MAC  Patient's Preference:   Intra-op Monitoring Plan: standard ASA monitors  Intra-op Monitoring Plan Comments:   Post Op Pain Control Plan:   Post Op Pain Control Plan Comments:   Induction:   IV  Beta Blocker:  Patient is not currently on a Beta-Blocker (No further documentation required).       Informed Consent: Patient understands risks and agrees with Anesthesia plan.  Questions answered. Anesthesia consent signed with patient.  ASA Score: 2     Day of Surgery Review of History & Physical:    H&P update referred to the surgeon.         Ready For Surgery From Anesthesia Perspective.

## 2017-08-29 NOTE — DISCHARGE SUMMARY
[facility]  Discharge Note  Short Stay    Admit Date: 8/29/2017    Discharge Date and Time: No discharge date for patient encounter.     Attending Physician: OSITO Mckenzie MD     Discharge Provider: MAIRA Mckenzie    Diagnoses:  Active Hospital Problems    Diagnosis  POA    Elevated PSA [R97.20]  Yes      Resolved Hospital Problems    Diagnosis Date Resolved POA   No resolved problems to display.       Discharged Condition: stable    Hospital Course: Patient was admitted for an outpatient procedure and tolerated the procedure well with no complications.    Final Diagnoses: Same as principal problem.    Disposition: Home or Self Care    Follow up/Patient Instructions:    Medications:  Reconciled Home Medications:   Current Discharge Medication List      CONTINUE these medications which have NOT CHANGED    Details   acetaminophen (TYLENOL) 500 MG tablet Take 2 tablets (1,000 mg total) by mouth 3 (three) times daily as needed for Pain.  Qty: 30 tablet, Refills: 0      aspirin 81 mg Tab Take 1 tablet by mouth Daily.      levoFLOXacin (LEVAQUIN) 500 MG tablet Take 1 tablet (500 mg total) by mouth once daily.  Qty: 3 tablet, Refills: 0    Associated Diagnoses: Elevated PSA      multivitamin capsule Take 1 capsule by mouth once daily.      tamsulosin (FLOMAX) 0.4 mg Cp24 Take 2 capsules (0.8 mg total) by mouth once daily.  Qty: 180 capsule, Refills: 3    Associated Diagnoses: Urinary retention      sildenafil (VIAGRA) 50 MG tablet Take 1 tablet by mouth Use as needed.                Discharge Procedure Orders  Diet general     Activity as tolerated     Call MD for:  temperature >100.4     Call MD for:  persistent nausea and vomiting or diarrhea     Call MD for:  severe uncontrolled pain           Discharge Procedure Orders (must include Diet, Follow-up, Activity):    Discharge Procedure Orders (must include Diet, Follow-up, Activity)  Diet general     Activity as tolerated     Call MD for:  temperature  >100.4     Call MD for:  persistent nausea and vomiting or diarrhea     Call MD for:  severe uncontrolled pain      I will call for follow up

## 2017-08-29 NOTE — OP NOTE
DATE OF PROCEDURE:  08/29/2017.    PREOPERATIVE DIAGNOSIS:  Elevated PSA.    POSTOPERATIVE DIAGNOSIS:  Elevated PSA.    OPERATIVE PROCEDURES:  Transrectal ultrasound, ultrasound guidance and prostate   biopsy.    ANESTHESIA:  General.    COMPLICATIONS:  None.    SPECIMEN:  Prostate biopsy.    BLOOD LOSS:  None.    SURGEON:  Nitish Mckenzie M.D.     ASSISTANT:  None.    INDICATIONS:  Mr. Matias is a 72-year-old male with an elevated PSA of 7.9.  I   recommended prostate biopsy.    PROCEDURE IN DETAIL:  After informed consent was obtained, he was brought to the   operating room.  He was given preoperative antibiotics.  After adequate general   anesthesia was achieved, he was placed in the left lateral decubitus position.    On digital rectal exam, the prostate was smooth, symmetrical and anodular.  The   ultrasound probe was then placed into the rectum without difficulty.  Dynamic   images were obtained of the entire gland.  There were no nodules and there was   homogenous echotexture of the entire gland.  Prostate volume was calculated to   be 40.4 mL.  Standard prostate block was then performed by injecting 10 mL of 1%   lidocaine into the periprostatic spaces.  Standard template prostate biopsy was   then performed.  Twelve cores were obtained in total.  Biopsy specimens were   fully submerged in formalin, labeled with the patient's name and sent for   pathological evaluation.  I then removed the ultrasound probe.  Hemostasis   appeared to be adequate.  He tolerated the procedure well and there were no   complications.      GINA  dd: 08/29/2017 09:01:18 (CDT)  td: 08/29/2017 10:15:26 (MATHEWT)  Doc ID   #[id number]  Job ID #[id number]    CC:

## 2017-08-29 NOTE — ANESTHESIA POSTPROCEDURE EVALUATION
"Anesthesia Post Evaluation    Patient: Stas Matias JrMali    Procedure(s) Performed: Procedure(s) (LRB):  TRANSRECTAL ULTRASOUND GUIDED PROSTATE BIOPSY (N/A)    Final Anesthesia Type: MAC  Patient location during evaluation: PACU  Patient participation: Yes- Able to Participate  Level of consciousness: awake and alert  Post-procedure vital signs: reviewed and stable  Pain management: adequate  Airway patency: patent  PONV status at discharge: No PONV  Anesthetic complications: no      Cardiovascular status: blood pressure returned to baseline  Respiratory status: unassisted  Hydration status: euvolemic  Follow-up not needed.        Visit Vitals  /85   Pulse 60   Temp 36.8 °C (98.2 °F) (Skin)   Ht 5' 11" (1.803 m)   Wt 79.4 kg (175 lb)   SpO2 95%   BMI 24.41 kg/m²       Pain/Baldemar Score: Pain Assessment Performed: Yes (8/29/2017  8:08 AM)  Presence of Pain: denies (8/29/2017  9:02 AM)  Baldemar Score: 9 (8/29/2017  9:02 AM)      "

## 2017-08-29 NOTE — OP NOTE
[facility]  Surgery Department  Operative Note    SUMMARY     Date of Procedure: 8/29/2017     Procedure: Procedure(s) (LRB):  TRANSRECTAL ULTRASOUND GUIDED PROSTATE BIOPSY (N/A)     Surgeon(s) and Role:     * OSITO Mckenzie MD - Primary    Assisting Surgeon: None    Pre-Operative Diagnosis: Elevated PSA [R97.20]    Post-Operative Diagnosis: Post-Op Diagnosis Codes:     * Elevated PSA [R97.20]    Anesthesia: Local MAC    Technical Procedures Used: US guidance    Description of the Findings of the Procedure: 40.4 ml gland    Significant Surgical Tasks Conducted by the Assistant(s), if Applicable: n/a    Complications: No    Estimated Blood Loss (EBL): * No values recorded between 8/29/2017  8:43 AM and 8/29/2017  8:54 AM *           Implants: * No implants in log *    Specimens:   Specimen (12h ago through future)    None                  Condition: Stable    Disposition: PACU - hemodynamically stable.    Attestation: I performed the procedure.

## 2017-08-29 NOTE — INTERVAL H&P NOTE
The patient has been examined and the H&P has been reviewed:    I concur with the findings and changes have been noted since the H&P was written: Repeat PSA is 7.9 and I recommend prostate needle biopsy    Anesthesia/Surgery risks, benefits and alternative options discussed and understood by patient/family.          Active Hospital Problems    Diagnosis  POA    Elevated PSA [R97.20]  Yes      Resolved Hospital Problems    Diagnosis Date Resolved POA   No resolved problems to display.

## 2017-08-29 NOTE — TRANSFER OF CARE
"Anesthesia Transfer of Care Note    Patient: Stas Matias Jr.    Procedure(s) Performed: Procedure(s) (LRB):  TRANSRECTAL ULTRASOUND GUIDED PROSTATE BIOPSY (N/A)    Patient location: PACU    Anesthesia Type: MAC    Transport from OR: Transported from OR on room air with adequate spontaneous ventilation    Post pain: adequate analgesia    Post assessment: no apparent anesthetic complications    Post vital signs: stable    Level of consciousness: awake    Complications: none    Transfer of care protocol was followed      Last vitals:   Visit Vitals  BP (!) 143/67 (BP Location: Right arm, Patient Position: Lying)   Pulse 67   Temp 36.3 °C (97.3 °F) (Skin)   Ht 5' 11" (1.803 m)   Wt 79.4 kg (175 lb)   SpO2 99%   BMI 24.41 kg/m²     "

## 2017-08-29 NOTE — DISCHARGE INSTRUCTIONS
PROSTATE BIOPSY      DOS:   Minimal activity for 24 hours.   May shower or bathe today.   Advance diet as tolerated.   Drink a lot of liquids until you see your doctor.   Expect blood in urine/stool for up to 3 weeks.   Expect blood in semen for up to 8 weeks.   Resume home medications as prescribed   Biopsy results may not be available for 10-14 days    DONT:   No driving for 24 hours or while taking narcotic pain medication   No aspirin, NSAIDS or blood thinners for 7 days   No sexual intercourse, heavy lifting, or strenuous activity for 7 days.   DO NOT TAKE ADDITIONAL TYLENOL/ACETAMINOPHEN WHILE TAKING NARCOTIC PAIN MEDICATION THAT CONTAINS TYLENOL/ACETAMINOPHEN.    CALL PHYSICIAN FOR:   Unable to urinate within 6 hours after surgery.   Excessive bleeding.    Fever>101   Persistent pain not relieved by pain medication.    Contact your physician for emergencies at [phone]       Recovery After Procedural Sedation (Adult)  You have been given medicine by vein to make you sleep during your surgery. This may have included both a pain medicine and sleeping medicine. Most of the effects have worn off. But you may still have some drowsiness for the next 6 to 8 hours.  Home care  Follow these guidelines when you get home:  · For the next 8 hours, you should be watched by a responsible adult. This person should make sure your condition is not getting worse.  · Don't drink any alcohol for the next 24 hours.  · Don't drive, operate dangerous machinery, or make important business or personal decisions during the next 24 hours.  Note: Your healthcare provider may tell you not to take any medicine by mouth for pain or sleep in the next 4 hours. These medicines may react with the medicines you were given in the hospital. This could cause a much stronger response than usual.  Follow-up care  Follow up with your healthcare provider if you are not alert and back to your usual level of activity within 12  hours.  When to seek medical advice  Call your healthcare provider right away if any of these occur:  · Drowsiness gets worse  · Weakness or dizziness gets worse  · Repeated vomiting  · You can't be awakened   Date Last Reviewed: 10/18/2016  © 3163-1860 [employer]. [address] [place] [address], [place] [address]. All rights reserved. This information is not intended as a substitute for professional medical care. Always follow your healthcare professional's instructions.

## 2017-08-30 VITALS
WEIGHT: 175 LBS | TEMPERATURE: 98 F | SYSTOLIC BLOOD PRESSURE: 128 MMHG | BODY MASS INDEX: 24.5 KG/M2 | DIASTOLIC BLOOD PRESSURE: 77 MMHG | OXYGEN SATURATION: 96 % | RESPIRATION RATE: 18 BRPM | HEART RATE: 66 BPM | HEIGHT: 71 IN

## 2018-02-07 ENCOUNTER — OFFICE VISIT (OUTPATIENT)
Dept: UROLOGY | Facility: CLINIC | Age: 73
End: 2018-02-07
Payer: MEDICARE

## 2018-02-07 ENCOUNTER — LAB VISIT (OUTPATIENT)
Dept: LAB | Facility: HOSPITAL | Age: 73
End: 2018-02-07
Attending: UROLOGY
Payer: COMMERCIAL

## 2018-02-07 VITALS
DIASTOLIC BLOOD PRESSURE: 61 MMHG | HEIGHT: 71 IN | HEART RATE: 52 BPM | BODY MASS INDEX: 23.77 KG/M2 | SYSTOLIC BLOOD PRESSURE: 106 MMHG | WEIGHT: 169.75 LBS

## 2018-02-07 DIAGNOSIS — N13.8 ENLARGED PROSTATE WITH URINARY OBSTRUCTION: Primary | ICD-10-CM

## 2018-02-07 DIAGNOSIS — N40.1 ENLARGED PROSTATE WITH URINARY OBSTRUCTION: Primary | ICD-10-CM

## 2018-02-07 DIAGNOSIS — R97.20 ELEVATED PSA: ICD-10-CM

## 2018-02-07 DIAGNOSIS — Z80.42 FAMILY HISTORY OF PROSTATE CANCER: ICD-10-CM

## 2018-02-07 LAB
BILIRUB SERPL-MCNC: NORMAL MG/DL
BLOOD URINE, POC: NORMAL
COLOR, POC UA: YELLOW
COMPLEXED PSA SERPL-MCNC: 6.1 NG/ML
GLUCOSE UR QL STRIP: NORMAL
KETONES UR QL STRIP: NORMAL
LEUKOCYTE ESTERASE URINE, POC: NORMAL
NITRITE, POC UA: NORMAL
PH, POC UA: 5
PROTEIN, POC: NORMAL
SPECIFIC GRAVITY, POC UA: 1.02
UROBILINOGEN, POC UA: NORMAL

## 2018-02-07 PROCEDURE — 3008F BODY MASS INDEX DOCD: CPT | Mod: S$GLB,,, | Performed by: UROLOGY

## 2018-02-07 PROCEDURE — 1159F MED LIST DOCD IN RCRD: CPT | Mod: S$GLB,,, | Performed by: UROLOGY

## 2018-02-07 PROCEDURE — 99214 OFFICE O/P EST MOD 30 MIN: CPT | Mod: 25,S$GLB,, | Performed by: UROLOGY

## 2018-02-07 PROCEDURE — 1126F AMNT PAIN NOTED NONE PRSNT: CPT | Mod: S$GLB,,, | Performed by: UROLOGY

## 2018-02-07 PROCEDURE — 99999 PR PBB SHADOW E&M-EST. PATIENT-LVL III: CPT | Mod: PBBFAC,,, | Performed by: UROLOGY

## 2018-02-07 PROCEDURE — 36415 COLL VENOUS BLD VENIPUNCTURE: CPT | Mod: PO

## 2018-02-07 PROCEDURE — 81002 URINALYSIS NONAUTO W/O SCOPE: CPT | Mod: S$GLB,,, | Performed by: UROLOGY

## 2018-02-07 PROCEDURE — 84153 ASSAY OF PSA TOTAL: CPT

## 2018-02-07 RX ORDER — TRAMADOL HYDROCHLORIDE 50 MG/1
50 TABLET ORAL EVERY 6 HOURS PRN
COMMUNITY
End: 2018-07-13 | Stop reason: SDUPTHER

## 2018-02-07 NOTE — PROGRESS NOTES
Subjective:       Patient ID: Stas Matias Jr. is a 72 y.o. male.    Chief Complaint: Follow-up    HPI     72 year old seen 6 months ago for elevated PSA.  He underwent benign prostate needle biopsy.  He has a history of urinary retention.  He is taking Flomax to daily.  His voiding symptoms are improved and he has no complaints today  He denies hematuria and dysuria.  We previously discussed surgical treatment options for but he is overall satisfied for now.  He has a family history of prostate cancer.  Father has rad prostatectomy  Urine dipstick shows negative for all components.    Component PSA, SCREEN   Latest Ref Rng & Units 0.00 - 4.00 ng/mL   8/9/2017 7.9 (H)   2/24/2017 4.2 (H)   1/11/2016 4.5 (H)   4/4/2014 2.3   3/27/2013 2.45   3/21/2012 2.15   2/22/2011 1.6         Review of Systems   Constitutional: Negative for fever.   Genitourinary: Negative for dysuria and hematuria.       Objective:      Physical Exam   Constitutional: He is oriented to person, place, and time. He appears well-developed and well-nourished.   HENT:   Head: Normocephalic and atraumatic.   Eyes: Conjunctivae are normal.   Cardiovascular: Normal rate.    Pulmonary/Chest: Effort normal.   Genitourinary: Rectal exam shows no mass and anal tone normal. Prostate is enlarged (>50g, s/s/a). Prostate is not tender.   Musculoskeletal: Normal range of motion.   Neurological: He is alert and oriented to person, place, and time.   Skin: Skin is warm and dry. No rash noted.   Psychiatric: He has a normal mood and affect.   Vitals reviewed.      Assessment:       1. Enlarged prostate with urinary obstruction    2. Elevated PSA    3. Family history of prostate cancer        Plan:       Enlarged prostate with urinary obstruction  -     POCT URINE DIPSTICK WITHOUT MICROSCOPE    Elevated PSA  -     Prostate Specific Antigen, Diagnostic; Future; Expected date: 02/07/2018    Family history of prostate cancer

## 2018-02-08 ENCOUNTER — TELEPHONE (OUTPATIENT)
Dept: UROLOGY | Facility: CLINIC | Age: 73
End: 2018-02-08

## 2018-02-08 NOTE — TELEPHONE ENCOUNTER
----- Message from Sharon Lee sent at 2/8/2018 11:37 AM CST -----  Contact: Riki Mcclelland, patient [phone] returning phone call to office. Please advise. Thanks.

## 2018-03-08 RX ORDER — TRAMADOL HYDROCHLORIDE 50 MG/1
TABLET ORAL
Qty: 90 TABLET | Refills: 0 | OUTPATIENT
Start: 2018-03-08

## 2018-05-10 DIAGNOSIS — R33.9 URINARY RETENTION: ICD-10-CM

## 2018-05-10 RX ORDER — TAMSULOSIN HYDROCHLORIDE 0.4 MG/1
CAPSULE ORAL
Qty: 180 CAPSULE | Refills: 3 | Status: SHIPPED | OUTPATIENT
Start: 2018-05-10 | End: 2019-01-23

## 2018-07-13 PROBLEM — R97.20 ELEVATED PSA: Status: RESOLVED | Noted: 2017-08-29 | Resolved: 2018-07-13

## 2018-08-08 ENCOUNTER — OFFICE VISIT (OUTPATIENT)
Dept: UROLOGY | Facility: CLINIC | Age: 73
End: 2018-08-08
Payer: COMMERCIAL

## 2018-08-08 VITALS
HEART RATE: 59 BPM | SYSTOLIC BLOOD PRESSURE: 119 MMHG | DIASTOLIC BLOOD PRESSURE: 60 MMHG | WEIGHT: 176.38 LBS | HEIGHT: 72 IN | BODY MASS INDEX: 23.89 KG/M2

## 2018-08-08 DIAGNOSIS — N40.0 BENIGN PROSTATIC HYPERPLASIA, UNSPECIFIED WHETHER LOWER URINARY TRACT SYMPTOMS PRESENT: Primary | ICD-10-CM

## 2018-08-08 DIAGNOSIS — Z12.5 SCREENING FOR PROSTATE CANCER: ICD-10-CM

## 2018-08-08 PROCEDURE — 99213 OFFICE O/P EST LOW 20 MIN: CPT | Mod: 25,S$GLB,, | Performed by: UROLOGY

## 2018-08-08 PROCEDURE — 3074F SYST BP LT 130 MM HG: CPT | Mod: CPTII,S$GLB,, | Performed by: UROLOGY

## 2018-08-08 PROCEDURE — 99999 PR PBB SHADOW E&M-EST. PATIENT-LVL III: CPT | Mod: PBBFAC,,, | Performed by: UROLOGY

## 2018-08-08 PROCEDURE — 3078F DIAST BP <80 MM HG: CPT | Mod: CPTII,S$GLB,, | Performed by: UROLOGY

## 2018-08-08 PROCEDURE — 81002 URINALYSIS NONAUTO W/O SCOPE: CPT | Mod: S$GLB,,, | Performed by: UROLOGY

## 2018-08-08 NOTE — PROGRESS NOTES
Subjective:       Patient ID: Stas Matias Jr. is a 72 y.o. male.    Chief Complaint: Follow-up    HPI     72 year old with elevated PSA.  He underwent benign prostate needle biopsy 08/2017 and his PSA was 7.9 at that time.  He also has a history of urinary retention.  He is taking Flomax to daily.  His voiding symptoms are improved and he has no complaints today.  He denies hematuria and dysuria.  We previously discussed surgical treatment options for but he is overall satisfied for now.  He has a family history of prostate cancer.  Father has rad prostatectomy.  His most recent PSA is decreased to 6.1 and is scheduled for another PSA next month.  Urine dipstick shows negative for all components.      Component PSA, SCREEN   Latest Ref Rng & Units 0.00 - 4.00 ng/mL   2/7/2018 6.1   8/9/2017 7.9   2/24/2017 4.2   1/11/2016 4.5   4/4/2014 2.3   3/27/2013 2.45       Review of Systems   Constitutional: Negative for fever.   Genitourinary: Negative for dysuria and hematuria.       Objective:      Physical Exam   Constitutional: He is oriented to person, place, and time. He appears well-developed and well-nourished.   Pulmonary/Chest: Effort normal.   Genitourinary:   Genitourinary Comments: Refuses WILMER today   Neurological: He is alert and oriented to person, place, and time.   Skin: No rash noted.   Psychiatric: He has a normal mood and affect.   Vitals reviewed.      Assessment:       1. Benign prostatic hyperplasia, unspecified whether lower urinary tract symptoms present    2. Screening for prostate cancer        Plan:       Benign prostatic hyperplasia, unspecified whether lower urinary tract symptoms present  -     POCT URINE DIPSTICK WITHOUT MICROSCOPE    Screening for prostate cancer      f/u PSA.  RTC 6 months

## 2018-09-04 ENCOUNTER — LAB VISIT (OUTPATIENT)
Dept: LAB | Facility: HOSPITAL | Age: 73
End: 2018-09-04
Attending: INTERNAL MEDICINE
Payer: COMMERCIAL

## 2018-09-04 DIAGNOSIS — E07.9 THYROID DISORDER: ICD-10-CM

## 2018-09-04 DIAGNOSIS — Z12.5 SCREENING FOR PROSTATE CANCER: ICD-10-CM

## 2018-09-04 DIAGNOSIS — E78.49 OTHER HYPERLIPIDEMIA: ICD-10-CM

## 2018-09-04 DIAGNOSIS — D64.9 NORMOCYTIC ANEMIA: ICD-10-CM

## 2018-09-04 DIAGNOSIS — R73.9 HYPERGLYCEMIA: ICD-10-CM

## 2018-09-04 DIAGNOSIS — Z51.81 THERAPEUTIC DRUG MONITORING: ICD-10-CM

## 2018-09-04 DIAGNOSIS — M79.10 MYALGIA: ICD-10-CM

## 2018-09-04 DIAGNOSIS — E55.9 VITAMIN D DEFICIENCY: ICD-10-CM

## 2018-09-04 LAB
25(OH)D3+25(OH)D2 SERPL-MCNC: 48 NG/ML
ALBUMIN SERPL BCP-MCNC: 3.8 G/DL
ALP SERPL-CCNC: 83 U/L
ALT SERPL W/O P-5'-P-CCNC: 31 U/L
ANION GAP SERPL CALC-SCNC: 6 MMOL/L
AST SERPL-CCNC: 23 U/L
BASOPHILS # BLD AUTO: 0.03 K/UL
BASOPHILS NFR BLD: 0.5 %
BILIRUB SERPL-MCNC: 0.6 MG/DL
BUN SERPL-MCNC: 17 MG/DL
CALCIUM SERPL-MCNC: 9.6 MG/DL
CHLORIDE SERPL-SCNC: 109 MMOL/L
CHOLEST SERPL-MCNC: 136 MG/DL
CHOLEST/HDLC SERPL: 4 {RATIO}
CK SERPL-CCNC: 85 U/L
CO2 SERPL-SCNC: 27 MMOL/L
COMPLEXED PSA SERPL-MCNC: 3.7 NG/ML
CREAT SERPL-MCNC: 0.9 MG/DL
DIFFERENTIAL METHOD: ABNORMAL
EOSINOPHIL # BLD AUTO: 0.3 K/UL
EOSINOPHIL NFR BLD: 4.3 %
ERYTHROCYTE [DISTWIDTH] IN BLOOD BY AUTOMATED COUNT: 12.9 %
EST. GFR  (AFRICAN AMERICAN): >60 ML/MIN/1.73 M^2
EST. GFR  (NON AFRICAN AMERICAN): >60 ML/MIN/1.73 M^2
ESTIMATED AVG GLUCOSE: 114 MG/DL
GLUCOSE SERPL-MCNC: 108 MG/DL
HBA1C MFR BLD HPLC: 5.6 %
HCT VFR BLD AUTO: 40.4 %
HDLC SERPL-MCNC: 34 MG/DL
HDLC SERPL: 25 %
HGB BLD-MCNC: 13.1 G/DL
IMM GRANULOCYTES # BLD AUTO: 0.01 K/UL
IMM GRANULOCYTES NFR BLD AUTO: 0.2 %
LDLC SERPL CALC-MCNC: 75.8 MG/DL
LYMPHOCYTES # BLD AUTO: 2 K/UL
LYMPHOCYTES NFR BLD: 31.3 %
MCH RBC QN AUTO: 30.8 PG
MCHC RBC AUTO-ENTMCNC: 32.4 G/DL
MCV RBC AUTO: 95 FL
MONOCYTES # BLD AUTO: 0.6 K/UL
MONOCYTES NFR BLD: 9.9 %
NEUTROPHILS # BLD AUTO: 3.4 K/UL
NEUTROPHILS NFR BLD: 53.8 %
NONHDLC SERPL-MCNC: 102 MG/DL
NRBC BLD-RTO: 0 /100 WBC
PLATELET # BLD AUTO: 251 K/UL
PMV BLD AUTO: 10.4 FL
POTASSIUM SERPL-SCNC: 4.1 MMOL/L
PROT SERPL-MCNC: 6.5 G/DL
RBC # BLD AUTO: 4.26 M/UL
SODIUM SERPL-SCNC: 142 MMOL/L
T4 FREE SERPL-MCNC: 0.75 NG/DL
TRIGL SERPL-MCNC: 131 MG/DL
TSH SERPL DL<=0.005 MIU/L-ACNC: 1.73 UIU/ML
WBC # BLD AUTO: 6.27 K/UL

## 2018-09-04 PROCEDURE — 84443 ASSAY THYROID STIM HORMONE: CPT

## 2018-09-04 PROCEDURE — 80061 LIPID PANEL: CPT

## 2018-09-04 PROCEDURE — 82306 VITAMIN D 25 HYDROXY: CPT

## 2018-09-04 PROCEDURE — 80053 COMPREHEN METABOLIC PANEL: CPT

## 2018-09-04 PROCEDURE — 84153 ASSAY OF PSA TOTAL: CPT

## 2018-09-04 PROCEDURE — 85025 COMPLETE CBC W/AUTO DIFF WBC: CPT

## 2018-09-04 PROCEDURE — 83036 HEMOGLOBIN GLYCOSYLATED A1C: CPT

## 2018-09-04 PROCEDURE — 84439 ASSAY OF FREE THYROXINE: CPT

## 2018-09-04 PROCEDURE — 36415 COLL VENOUS BLD VENIPUNCTURE: CPT | Mod: PO

## 2018-09-04 PROCEDURE — 82550 ASSAY OF CK (CPK): CPT

## 2018-10-23 ENCOUNTER — OFFICE VISIT (OUTPATIENT)
Dept: CARDIOLOGY | Facility: CLINIC | Age: 73
End: 2018-10-23
Payer: MEDICARE

## 2018-10-23 VITALS
SYSTOLIC BLOOD PRESSURE: 138 MMHG | DIASTOLIC BLOOD PRESSURE: 60 MMHG | HEART RATE: 51 BPM | WEIGHT: 179 LBS | HEIGHT: 72 IN | BODY MASS INDEX: 24.24 KG/M2

## 2018-10-23 DIAGNOSIS — I10 ESSENTIAL HYPERTENSION: Primary | ICD-10-CM

## 2018-10-23 DIAGNOSIS — Z82.49 FAMILY HISTORY OF EARLY CAD: ICD-10-CM

## 2018-10-23 DIAGNOSIS — E78.1 HYPERTRIGLYCERIDEMIA: ICD-10-CM

## 2018-10-23 DIAGNOSIS — I70.0 ABDOMINAL AORTIC ATHEROSCLEROSIS: ICD-10-CM

## 2018-10-23 DIAGNOSIS — E78.5 DYSLIPIDEMIA: ICD-10-CM

## 2018-10-23 DIAGNOSIS — Z87.891 HISTORY OF TOBACCO ABUSE: ICD-10-CM

## 2018-10-23 PROCEDURE — 99999 PR PBB SHADOW E&M-EST. PATIENT-LVL III: CPT | Mod: PBBFAC,,, | Performed by: INTERNAL MEDICINE

## 2018-10-23 PROCEDURE — 99213 OFFICE O/P EST LOW 20 MIN: CPT | Mod: PBBFAC,PO | Performed by: INTERNAL MEDICINE

## 2018-10-23 PROCEDURE — 99204 OFFICE O/P NEW MOD 45 MIN: CPT | Mod: S$GLB,,, | Performed by: INTERNAL MEDICINE

## 2018-10-23 NOTE — PROGRESS NOTES
Subjective:    Patient ID:  Stas Matias Jr. is a 73 y.o. male who presents for evaluation of No chief complaint on file.      HPI  Referred here by Dr. Kyle to evaluate HTN/ DLP.Patients states is doing well no chest pain, SOB or change in exertional tolerence. Patient is exercising regularly with out symptoms.      Review of Systems   Constitution: Negative for malaise/fatigue.   Eyes: Negative for blurred vision.   Cardiovascular: Negative for chest pain, claudication, cyanosis, dyspnea on exertion, irregular heartbeat, leg swelling, near-syncope, orthopnea, palpitations, paroxysmal nocturnal dyspnea and syncope.   Respiratory: Negative for cough and shortness of breath.    Hematologic/Lymphatic: Does not bruise/bleed easily.   Musculoskeletal: Negative for back pain, falls, joint pain, muscle cramps, muscle weakness and myalgias.   Gastrointestinal: Negative for abdominal pain, change in bowel habit, nausea and vomiting.   Genitourinary: Negative for urgency.   Neurological: Negative for dizziness, focal weakness and light-headedness.        Objective:    Physical Exam   Constitutional: He is oriented to person, place, and time. He appears well-developed and well-nourished. He is cooperative.   HENT:   Head: Normocephalic and atraumatic.   Eyes: Conjunctivae are normal. Right eye exhibits no exudate. Left eye exhibits no exudate.   Neck: Normal range of motion. Neck supple. Normal carotid pulses and no JVD present. Carotid bruit is not present. No thyromegaly present.   Cardiovascular: Normal rate, regular rhythm, normal heart sounds and intact distal pulses.   Pulses:       Carotid pulses are 2+ on the right side, and 2+ on the left side.       Radial pulses are 2+ on the right side, and 2+ on the left side.        Dorsalis pedis pulses are 2+ on the right side, and 2+ on the left side.        Posterior tibial pulses are 2+ on the right side, and 2+ on the left side.   Pulmonary/Chest: Effort normal  and breath sounds normal.   Abdominal: Soft. Bowel sounds are normal.   Musculoskeletal: Normal range of motion. He exhibits no edema.   Neurological: He is alert and oriented to person, place, and time. Gait normal.   Skin: Skin is warm, dry and intact. No cyanosis. Nails show no clubbing.   Psychiatric: He has a normal mood and affect. His speech is normal and behavior is normal. Judgment and thought content normal.   Nursing note and vitals reviewed.              ..    Chemistry        Component Value Date/Time     09/04/2018 0658    K 4.1 09/04/2018 0658     09/04/2018 0658    CO2 27 09/04/2018 0658    BUN 17 09/04/2018 0658    CREATININE 0.9 09/04/2018 0658     09/04/2018 0658        Component Value Date/Time    CALCIUM 9.6 09/04/2018 0658    ALKPHOS 83 09/04/2018 0658    AST 23 09/04/2018 0658    ALT 31 09/04/2018 0658    BILITOT 0.6 09/04/2018 0658    ESTGFRAFRICA >60.0 09/04/2018 0658    EGFRNONAA >60.0 09/04/2018 0658            ..  Lab Results   Component Value Date    CHOL 136 09/04/2018    CHOL 298 (H) 02/24/2017    CHOL 269 (H) 01/11/2016     Lab Results   Component Value Date    HDL 34 (L) 09/04/2018    HDL 46 02/24/2017    HDL 37 (L) 01/11/2016     Lab Results   Component Value Date    LDLCALC 75.8 09/04/2018    LDLCALC 216.8 (H) 02/24/2017    LDLCALC 186.2 (H) 01/11/2016     Lab Results   Component Value Date    TRIG 131 09/04/2018    TRIG 176 (H) 02/24/2017    TRIG 229 (H) 01/11/2016     Lab Results   Component Value Date    CHOLHDL 25.0 09/04/2018    CHOLHDL 15.4 (L) 02/24/2017    CHOLHDL 13.8 (L) 01/11/2016     ..  Lab Results   Component Value Date    WBC 6.27 09/04/2018    HGB 13.1 (L) 09/04/2018    HCT 40.4 09/04/2018    MCV 95 09/04/2018     09/04/2018       Test(s) Reviewed  I have reviewed the following in detail:  [] Stress test   [] Angiography   [x] Echocardiogram   [x] Labs   [] Other:       Assessment:         ICD-10-CM ICD-9-CM   1. Essential hypertension I10  401.9   2. Mild Hypertriglyceridemia E78.1 272.1   3. Dyslipidemia E78.5 272.4   4. Aortic Atherosclerosis I70.0 440.0   5. Family H/O CAD Z82.49 V17.3   6. H/O 1 PPD X 53 YRs TUD, Quit In 2016 Z87.891 V15.82     Problem List Items Addressed This Visit     Aortic Atherosclerosis    Dyslipidemia    Family H/O CAD    H/O 1 PPD X 53 YRs TUD, Quit In 2016      Other Visit Diagnoses     Essential hypertension    -  Primary    Mild Hypertriglyceridemia               Plan:           Return to clinic 4 months   Low level/low impact aerobic exercise 5x's/wk. Heart healthy diet and risk factor modification.    See labs and med orders.  Screening carotid, abd aorta US, CFD

## 2018-10-23 NOTE — LETTER
October 23, 2018      Vicente Kyle MD  [address]  [facility] [address]           [facility]  [address] [facility] [address] [place] [address]  Phone: [phone]          Patient: Stas Matias Jr.   MR Number: [id number]   YOB: 1945   Date of Visit: 10/23/2018       Dear Dr. Vicente Kyle:    Thank you for referring Stas Matias to me for evaluation. Attached you will find relevant portions of my assessment and plan of care.    If you have questions, please do not hesitate to call me. I look forward to following Stas Matias along with you.    Sincerely,    Tone Ruiz MD    Enclosure  CC:  No Recipients    If you would like to receive this communication electronically, please contact [email or web address] or [phone] to request more information on [employer] access.    For providers and/or their staff who would like to refer a patient to [facility], please contact us through our one-stop-shop provider referral line, [facility] , at [phone].    If you feel you have received this communication in error or would no longer like to receive these types of communications, please e-mail [email or web address]

## 2018-12-17 NOTE — TELEPHONE ENCOUNTER
Message reviewed.  Contact the patient and tell him his cholesterol is very elevated and he may be at risk for vascular disease in the future such as strokes and heart attacks.  It would be advisable to take another type that may not cause side effects and be very helpful to help prevent problems in the future.  Give back on response.  WB   patient

## 2019-01-23 PROBLEM — E78.5 DYSLIPIDEMIA: Status: RESOLVED | Noted: 2018-10-23 | Resolved: 2019-01-23

## 2019-01-24 ENCOUNTER — CLINICAL SUPPORT (OUTPATIENT)
Dept: CARDIOLOGY | Facility: CLINIC | Age: 74
End: 2019-01-24
Attending: INTERNAL MEDICINE
Payer: MEDICARE

## 2019-01-24 VITALS
HEIGHT: 71 IN | BODY MASS INDEX: 25.06 KG/M2 | HEART RATE: 47 BPM | SYSTOLIC BLOOD PRESSURE: 140 MMHG | DIASTOLIC BLOOD PRESSURE: 60 MMHG | WEIGHT: 179 LBS

## 2019-01-24 DIAGNOSIS — E78.1 HYPERTRIGLYCERIDEMIA: ICD-10-CM

## 2019-01-24 DIAGNOSIS — E78.5 DYSLIPIDEMIA: ICD-10-CM

## 2019-01-24 DIAGNOSIS — I70.0 ABDOMINAL AORTIC ATHEROSCLEROSIS: ICD-10-CM

## 2019-01-24 DIAGNOSIS — I10 ESSENTIAL HYPERTENSION: ICD-10-CM

## 2019-01-24 PROCEDURE — 93306 TRANSTHORACIC ECHO (TTE) COMPLETE: ICD-10-PCS | Mod: S$GLB,ICN,, | Performed by: INTERNAL MEDICINE

## 2019-01-24 PROCEDURE — 93306 TTE W/DOPPLER COMPLETE: CPT | Mod: PBBFAC,PO | Performed by: INTERNAL MEDICINE

## 2019-01-24 PROCEDURE — 76775 CV US ABDOMINAL AORTA LIMITED: ICD-10-PCS | Mod: S$GLB,ICN,, | Performed by: INTERNAL MEDICINE

## 2019-01-24 PROCEDURE — 93880 EXTRACRANIAL BILAT STUDY: CPT | Mod: PBBFAC,PO | Performed by: INTERNAL MEDICINE

## 2019-01-24 PROCEDURE — 99999 PR PBB SHADOW E&M-EST. PATIENT-LVL II: ICD-10-PCS | Mod: PBBFAC,,,

## 2019-01-24 PROCEDURE — 76775 US EXAM ABDO BACK WALL LIM: CPT | Mod: PBBFAC,PO | Performed by: INTERNAL MEDICINE

## 2019-01-24 PROCEDURE — 99212 OFFICE O/P EST SF 10 MIN: CPT | Mod: PBBFAC,PO

## 2019-01-24 PROCEDURE — 99999 PR PBB SHADOW E&M-EST. PATIENT-LVL II: CPT | Mod: PBBFAC,,,

## 2019-01-24 PROCEDURE — 93880 CV US DOPPLER CAROTID: ICD-10-PCS | Mod: S$GLB,ICN,, | Performed by: INTERNAL MEDICINE

## 2019-01-25 LAB
ABDOMINAL IMA AP: 1.1 CM
ABDOMINAL IMA ED VEL: 0 CM/S
ABDOMINAL IMA PS VEL: 102 CM/S
ABDOMINAL IMA TRANS: 1.2 CM
ABDOMINAL INFRARENAL AORTA AP: 1.8 CM
ABDOMINAL INFRARENAL AORTA ED VEL: 0 CM/S
ABDOMINAL INFRARENAL AORTA PS VEL: 95 CM/S
ABDOMINAL INFRARENAL AORTA TRANS: 1.6 CM
ABDOMINAL JUXTARENAL AORTA AP: 1.5 CM
ABDOMINAL JUXTARENAL AORTA ED VEL: 19 CM/S
ABDOMINAL JUXTARENAL AORTA PS VEL: 145 CM/S
ABDOMINAL JUXTARENAL AORTA TRANS: 1.8 CM
ABDOMINAL LT COM ILIAC AP: 0.6 CM
ABDOMINAL LT COM ILIAC TRANS: 0.9 CM
ABDOMINAL LT COM ILIAC VEL: 267 CM/S
ABDOMINAL LT COM ILLIAC ED VEL: 0 CM/S
ABDOMINAL RT COM ILIAC AP: 0.9 CM
ABDOMINAL RT COM ILIAC TRANS: 0.9 CM
ABDOMINAL RT COM ILIAC VEL: 175 CM/S
ABDOMINAL RT COM ILLIAC ED VEL: 0 CM/S
ABDOMINAL SUPRARENAL AORTA AP: 0.8 CM
ABDOMINAL SUPRARENAL AORTA ED VEL: 16 CM/S
ABDOMINAL SUPRARENAL AORTA PS VEL: 71 CM/S
ABDOMINAL SUPRARENAL AORTA TRANS: 0.8 CM
ASCENDING AORTA: 3.16 CM
AV INDEX (PROSTH): 0.91
AV MEAN GRADIENT: 3.28 MMHG
AV PEAK GRADIENT: 6.15 MMHG
AV VALVE AREA: 2.74 CM2
AV VELOCITY RATIO: 0.87
BSA FOR ECHO PROCEDURE: 2.02 M2
CV ECHO LV RWT: 0.29 CM
DOP CALC AO PEAK VEL: 1.24 M/S
DOP CALC AO VTI: 32.65 CM
DOP CALC LVOT AREA: 3.02 CM2
DOP CALC LVOT DIAMETER: 1.96 CM
DOP CALC LVOT PEAK VEL: 1.08 M/S
DOP CALC LVOT STROKE VOLUME: 89.56 CM3
DOP CALCLVOT PEAK VEL VTI: 29.7 CM
E WAVE DECELERATION TIME: 207.73 MSEC
E/A RATIO: 1.34
E/E' RATIO: 4.85
ECHO LV POSTERIOR WALL: 0.75 CM (ref 0.6–1.1)
FRACTIONAL SHORTENING: 43 % (ref 28–44)
INTERVENTRICULAR SEPTUM: 0.88 CM (ref 0.6–1.1)
IVRT: 0.08 MSEC
LA MAJOR: 5.64 CM
LA MINOR: 5.78 CM
LA WIDTH: 4.64 CM
LEFT ARM DIASTOLIC BLOOD PRESSURE: 60 MMHG
LEFT ARM SYSTOLIC BLOOD PRESSURE: 138 MMHG
LEFT ATRIUM SIZE: 3.87 CM
LEFT ATRIUM VOLUME INDEX: 43.3 ML/M2
LEFT ATRIUM VOLUME: 87.14 CM3
LEFT CBA DIAS: 11 CM/S
LEFT CBA SYS: 82 CM/S
LEFT CCA DIST DIAS: 16 CM/S
LEFT CCA DIST SYS: 114 CM/S
LEFT CCA MID DIAS: 25 CM/S
LEFT CCA MID SYS: 118 CM/S
LEFT CCA PROX DIAS: 18 CM/S
LEFT CCA PROX SYS: 125 CM/S
LEFT ECA DIAS: 5 CM/S
LEFT ECA SYS: 105 CM/S
LEFT ICA DIST DIAS: 32 CM/S
LEFT ICA DIST SYS: 80 CM/S
LEFT ICA MID DIAS: 18 CM/S
LEFT ICA MID SYS: 118 CM/S
LEFT ICA PROX DIAS: 11 CM/S
LEFT ICA PROX SYS: 86 CM/S
LEFT INTERNAL DIMENSION IN SYSTOLE: 2.93 CM (ref 2.1–4)
LEFT VENTRICLE DIASTOLIC VOLUME INDEX: 62.38 ML/M2
LEFT VENTRICLE DIASTOLIC VOLUME: 125.5 ML
LEFT VENTRICLE MASS INDEX: 72.2 G/M2
LEFT VENTRICLE SYSTOLIC VOLUME INDEX: 16.5 ML/M2
LEFT VENTRICLE SYSTOLIC VOLUME: 33.13 ML
LEFT VENTRICULAR INTERNAL DIMENSION IN DIASTOLE: 5.13 CM (ref 3.5–6)
LEFT VENTRICULAR MASS: 145.29 G
LEFT VERTEBRAL DIAS: 18 CM/S
LEFT VERTEBRAL SYS: 96 CM/S
LV LATERAL E/E' RATIO: 4.5
LV SEPTAL E/E' RATIO: 5.25
MV PEAK A VEL: 0.47 M/S
MV PEAK E VEL: 0.63 M/S
OHS CV CAROTID RIGHT ICA EDV HIGHEST: 35
OHS CV CAROTID ULTRASOUND LEFT ICA/CCA RATIO: 0.94
OHS CV CAROTID ULTRASOUND RIGHT ICA/CCA RATIO: 1.15
OHS CV PV CAROTID LEFT HIGHEST CCA: 125
OHS CV PV CAROTID LEFT HIGHEST ICA: 118
OHS CV PV CAROTID RIGHT HIGHEST CCA: 157
OHS CV PV CAROTID RIGHT HIGHEST ICA: 181
OHS CV US CAROTID LEFT HIGHEST EDV: 32
PISA TR MAX VEL: 2.26 M/S
PULM VEIN S/D RATIO: 0.79
PV PEAK D VEL: 0.57 M/S
PV PEAK S VEL: 0.45 M/S
RA MAJOR: 5.07 CM
RA PRESSURE: 3 MMHG
RA WIDTH: 4.8 CM
RIGHT ARM DIASTOLIC BLOOD PRESSURE: 60 MMHG
RIGHT ARM SYSTOLIC BLOOD PRESSURE: 138 MMHG
RIGHT CBA DIAS: 15 CM/S
RIGHT CBA SYS: 105 CM/S
RIGHT CCA DIST DIAS: 17 CM/S
RIGHT CCA DIST SYS: 90 CM/S
RIGHT CCA MID DIAS: 17 CM/S
RIGHT CCA MID SYS: 98 CM/S
RIGHT CCA PROX DIAS: 20 CM/S
RIGHT CCA PROX SYS: 157 CM/S
RIGHT ECA DIAS: 7 CM/S
RIGHT ECA SYS: 98 CM/S
RIGHT ICA DIST DIAS: 20 CM/S
RIGHT ICA DIST SYS: 98 CM/S
RIGHT ICA MID DIAS: 35 CM/S
RIGHT ICA MID SYS: 181 CM/S
RIGHT ICA PROX DIAS: 17 CM/S
RIGHT ICA PROX SYS: 114 CM/S
RIGHT VENTRICULAR END-DIASTOLIC DIMENSION: 4.5 CM
RIGHT VERTEBRAL DIAS: 15 CM/S
RIGHT VERTEBRAL SYS: 95 CM/S
SINUS: 3.36 CM
STJ: 2.99 CM
TDI LATERAL: 0.14
TDI SEPTAL: 0.12
TDI: 0.13
TR MAX PG: 20.43 MMHG
TRICUSPID ANNULAR PLANE SYSTOLIC EXCURSION: 2.23 CM
TV REST PULMONARY ARTERY PRESSURE: 23 MMHG

## 2019-02-04 ENCOUNTER — OFFICE VISIT (OUTPATIENT)
Dept: UROLOGY | Facility: CLINIC | Age: 74
End: 2019-02-04
Payer: MEDICARE

## 2019-02-04 VITALS
HEART RATE: 80 BPM | DIASTOLIC BLOOD PRESSURE: 78 MMHG | SYSTOLIC BLOOD PRESSURE: 124 MMHG | HEIGHT: 71 IN | WEIGHT: 180.31 LBS | BODY MASS INDEX: 25.24 KG/M2

## 2019-02-04 DIAGNOSIS — R97.20 ELEVATED PSA: ICD-10-CM

## 2019-02-04 DIAGNOSIS — N13.8 ENLARGED PROSTATE WITH URINARY OBSTRUCTION: Primary | ICD-10-CM

## 2019-02-04 DIAGNOSIS — N40.1 ENLARGED PROSTATE WITH URINARY OBSTRUCTION: Primary | ICD-10-CM

## 2019-02-04 DIAGNOSIS — Z80.42 FAMILY HISTORY OF PROSTATE CANCER: ICD-10-CM

## 2019-02-04 LAB
BILIRUB SERPL-MCNC: ABNORMAL MG/DL
BLOOD URINE, POC: ABNORMAL
COLOR, POC UA: YELLOW
GLUCOSE UR QL STRIP: ABNORMAL
KETONES UR QL STRIP: ABNORMAL
LEUKOCYTE ESTERASE URINE, POC: ABNORMAL
NITRITE, POC UA: ABNORMAL
PH, POC UA: 5
PROTEIN, POC: ABNORMAL
SPECIFIC GRAVITY, POC UA: 1.02
UROBILINOGEN, POC UA: ABNORMAL

## 2019-02-04 PROCEDURE — 99999 PR PBB SHADOW E&M-EST. PATIENT-LVL III: CPT | Mod: PBBFAC,,, | Performed by: UROLOGY

## 2019-02-04 PROCEDURE — 99213 PR OFFICE/OUTPT VISIT, EST, LEVL III, 20-29 MIN: ICD-10-PCS | Mod: S$GLB,ICN,, | Performed by: UROLOGY

## 2019-02-04 PROCEDURE — 99999 PR PBB SHADOW E&M-EST. PATIENT-LVL III: ICD-10-PCS | Mod: PBBFAC,,, | Performed by: UROLOGY

## 2019-02-04 PROCEDURE — 81002 URINALYSIS NONAUTO W/O SCOPE: CPT | Mod: PBBFAC,PO | Performed by: UROLOGY

## 2019-02-04 PROCEDURE — 99213 OFFICE O/P EST LOW 20 MIN: CPT | Mod: S$GLB,ICN,, | Performed by: UROLOGY

## 2019-02-04 PROCEDURE — 99213 OFFICE O/P EST LOW 20 MIN: CPT | Mod: PBBFAC,PO | Performed by: UROLOGY

## 2019-02-04 NOTE — PROGRESS NOTES
Subjective:       Patient ID: Stas Matias Jr. is a 73 y.o. male.    Chief Complaint: Follow-up    HPI     73 year old with elevated PSA.  He underwent benign prostate needle biopsy 08/2017 and his PSA was 7.9 at that time.  He also has a history of urinary retention.  He is taking Flomax. two daily.  His voiding symptoms are improved and he has no complaints today.  He denies hematuria and dysuria.  We previously discussed surgical treatment options for BPH but he is overall satisfied for now.  He has a family history of prostate cancer.  Father has rad prostatectomy.  His most recent PSA is decreased to 3.7.  Urine dipstick shows negative for all components.      Component PSA, SCREEN   Latest Ref Rng & Units 0.00 - 4.00 ng/mL   9/4/2018 3.7   2/7/2018 6.1   8/9/2017 7.9   2/24/2017 4.2   1/11/2016 4.5   4/4/2014 2.3   3/27/2013 2.45       Review of Systems   Constitutional: Negative for fever.   Genitourinary: Negative for dysuria and hematuria.       Objective:      Physical Exam   Constitutional: He is oriented to person, place, and time. He appears well-developed and well-nourished.   Pulmonary/Chest: Effort normal.   Neurological: He is alert and oriented to person, place, and time.   Skin: No rash noted.   Psychiatric: He has a normal mood and affect.   Vitals reviewed.      Assessment:       1. Enlarged prostate with urinary obstruction    2. Elevated PSA    3. Family history of prostate cancer        Plan:       Enlarged prostate with urinary obstruction  -     POCT urine dipstick without microscope    Elevated PSA    Family history of prostate cancer      Decreased to 1 Flomax  RTC 1 year

## 2019-02-27 ENCOUNTER — OFFICE VISIT (OUTPATIENT)
Dept: CARDIOLOGY | Facility: CLINIC | Age: 74
End: 2019-02-27
Payer: MEDICARE

## 2019-02-27 ENCOUNTER — LAB VISIT (OUTPATIENT)
Dept: LAB | Facility: HOSPITAL | Age: 74
End: 2019-02-27
Attending: INTERNAL MEDICINE
Payer: MEDICARE

## 2019-02-27 VITALS
WEIGHT: 182.13 LBS | HEIGHT: 71 IN | HEART RATE: 62 BPM | SYSTOLIC BLOOD PRESSURE: 111 MMHG | BODY MASS INDEX: 25.5 KG/M2 | DIASTOLIC BLOOD PRESSURE: 63 MMHG

## 2019-02-27 DIAGNOSIS — E78.1 HYPERTRIGLYCERIDEMIA: ICD-10-CM

## 2019-02-27 DIAGNOSIS — I10 ESSENTIAL HYPERTENSION: ICD-10-CM

## 2019-02-27 DIAGNOSIS — Z51.81 THERAPEUTIC DRUG MONITORING: ICD-10-CM

## 2019-02-27 DIAGNOSIS — I65.21 STENOSIS OF RIGHT CAROTID ARTERY: ICD-10-CM

## 2019-02-27 DIAGNOSIS — Z82.49 FAMILY HISTORY OF EARLY CAD: Primary | ICD-10-CM

## 2019-02-27 DIAGNOSIS — E78.00 HYPERCHOLESTEROLEMIA: ICD-10-CM

## 2019-02-27 DIAGNOSIS — I70.0 ABDOMINAL AORTIC ATHEROSCLEROSIS: ICD-10-CM

## 2019-02-27 LAB
ANION GAP SERPL CALC-SCNC: 7 MMOL/L
BUN SERPL-MCNC: 14 MG/DL
CALCIUM SERPL-MCNC: 9.6 MG/DL
CHLORIDE SERPL-SCNC: 109 MMOL/L
CO2 SERPL-SCNC: 25 MMOL/L
CREAT SERPL-MCNC: 0.8 MG/DL
EST. GFR  (AFRICAN AMERICAN): >60 ML/MIN/1.73 M^2
EST. GFR  (NON AFRICAN AMERICAN): >60 ML/MIN/1.73 M^2
GLUCOSE SERPL-MCNC: 106 MG/DL
POTASSIUM SERPL-SCNC: 4.2 MMOL/L
SODIUM SERPL-SCNC: 141 MMOL/L

## 2019-02-27 PROCEDURE — 99999 PR PBB SHADOW E&M-EST. PATIENT-LVL III: ICD-10-PCS | Mod: PBBFAC,,, | Performed by: INTERNAL MEDICINE

## 2019-02-27 PROCEDURE — 3078F DIAST BP <80 MM HG: CPT | Mod: CPTII,S$GLB,, | Performed by: INTERNAL MEDICINE

## 2019-02-27 PROCEDURE — 3074F PR MOST RECENT SYSTOLIC BLOOD PRESSURE < 130 MM HG: ICD-10-PCS | Mod: CPTII,S$GLB,, | Performed by: INTERNAL MEDICINE

## 2019-02-27 PROCEDURE — 1101F PT FALLS ASSESS-DOCD LE1/YR: CPT | Mod: CPTII,S$GLB,, | Performed by: INTERNAL MEDICINE

## 2019-02-27 PROCEDURE — 80048 BASIC METABOLIC PNL TOTAL CA: CPT

## 2019-02-27 PROCEDURE — 99214 PR OFFICE/OUTPT VISIT, EST, LEVL IV, 30-39 MIN: ICD-10-PCS | Mod: S$GLB,,, | Performed by: INTERNAL MEDICINE

## 2019-02-27 PROCEDURE — 99999 PR PBB SHADOW E&M-EST. PATIENT-LVL III: CPT | Mod: PBBFAC,,, | Performed by: INTERNAL MEDICINE

## 2019-02-27 PROCEDURE — 3074F SYST BP LT 130 MM HG: CPT | Mod: CPTII,S$GLB,, | Performed by: INTERNAL MEDICINE

## 2019-02-27 PROCEDURE — 36415 COLL VENOUS BLD VENIPUNCTURE: CPT | Mod: PO

## 2019-02-27 PROCEDURE — 99214 OFFICE O/P EST MOD 30 MIN: CPT | Mod: S$GLB,,, | Performed by: INTERNAL MEDICINE

## 2019-02-27 PROCEDURE — 1101F PR PT FALLS ASSESS DOC 0-1 FALLS W/OUT INJ PAST YR: ICD-10-PCS | Mod: CPTII,S$GLB,, | Performed by: INTERNAL MEDICINE

## 2019-02-27 PROCEDURE — 3078F PR MOST RECENT DIASTOLIC BLOOD PRESSURE < 80 MM HG: ICD-10-PCS | Mod: CPTII,S$GLB,, | Performed by: INTERNAL MEDICINE

## 2019-02-27 NOTE — PROGRESS NOTES
Subjective:    Patient ID:  Stas Matias Jr. is a 73 y.o. male who presents for follow-up of Hypertension F/U      HPI  Here for follow up of HTN/ DLP. Active man, no angina. Patient denies palpitations, syncope, presyncope, lightheadedness or dizziness.        Review of Systems   Constitution: Negative for malaise/fatigue.   Eyes: Negative for blurred vision.   Cardiovascular: Negative for chest pain, claudication, cyanosis, dyspnea on exertion, irregular heartbeat, leg swelling, near-syncope, orthopnea, palpitations, paroxysmal nocturnal dyspnea and syncope.   Respiratory: Negative for cough and shortness of breath.    Hematologic/Lymphatic: Does not bruise/bleed easily.   Musculoskeletal: Negative for back pain, falls, joint pain, muscle cramps, muscle weakness and myalgias.   Gastrointestinal: Negative for abdominal pain, change in bowel habit, nausea and vomiting.   Genitourinary: Negative for urgency.   Neurological: Negative for dizziness, focal weakness and light-headedness.        Objective:    Physical Exam   Constitutional: He is oriented to person, place, and time. He appears well-developed and well-nourished. He is cooperative.   HENT:   Head: Normocephalic and atraumatic.   Eyes: Conjunctivae are normal. Right eye exhibits no exudate. Left eye exhibits no exudate.   Neck: Normal range of motion. Neck supple. Normal carotid pulses and no JVD present. Carotid bruit is not present. No thyromegaly present.   Cardiovascular: Normal rate, regular rhythm, normal heart sounds and intact distal pulses.   Pulses:       Carotid pulses are 2+ on the right side, and 2+ on the left side.       Radial pulses are 2+ on the right side, and 2+ on the left side.        Dorsalis pedis pulses are 2+ on the right side, and 2+ on the left side.        Posterior tibial pulses are 2+ on the right side, and 2+ on the left side.   Pulmonary/Chest: Effort normal and breath sounds normal.   Abdominal: Soft. Bowel sounds are  normal.   Musculoskeletal: Normal range of motion. He exhibits no edema.   Neurological: He is alert and oriented to person, place, and time. Gait normal.   Skin: Skin is warm, dry and intact. No cyanosis. Nails show no clubbing.   Psychiatric: He has a normal mood and affect. His speech is normal and behavior is normal. Judgment and thought content normal.   Nursing note and vitals reviewed.              ..    Chemistry        Component Value Date/Time     09/04/2018 0658    K 4.1 09/04/2018 0658     09/04/2018 0658    CO2 27 09/04/2018 0658    BUN 17 09/04/2018 0658    CREATININE 0.9 09/04/2018 0658     09/04/2018 0658        Component Value Date/Time    CALCIUM 9.6 09/04/2018 0658    ALKPHOS 83 09/04/2018 0658    AST 23 09/04/2018 0658    ALT 31 09/04/2018 0658    BILITOT 0.6 09/04/2018 0658    ESTGFRAFRICA >60.0 09/04/2018 0658    EGFRNONAA >60.0 09/04/2018 0658            ..  Lab Results   Component Value Date    CHOL 136 09/04/2018    CHOL 298 (H) 02/24/2017    CHOL 269 (H) 01/11/2016     Lab Results   Component Value Date    HDL 34 (L) 09/04/2018    HDL 46 02/24/2017    HDL 37 (L) 01/11/2016     Lab Results   Component Value Date    LDLCALC 75.8 09/04/2018    LDLCALC 216.8 (H) 02/24/2017    LDLCALC 186.2 (H) 01/11/2016     Lab Results   Component Value Date    TRIG 131 09/04/2018    TRIG 176 (H) 02/24/2017    TRIG 229 (H) 01/11/2016     Lab Results   Component Value Date    CHOLHDL 25.0 09/04/2018    CHOLHDL 15.4 (L) 02/24/2017    CHOLHDL 13.8 (L) 01/11/2016     ..  Lab Results   Component Value Date    WBC 6.27 09/04/2018    HGB 13.1 (L) 09/04/2018    HCT 40.4 09/04/2018    MCV 95 09/04/2018     09/04/2018       Test(s) Reviewed  I have reviewed the following in detail:  [] Stress test   [] Angiography   [x] Echocardiogram   [x] Labs   [] Other:       Assessment:         ICD-10-CM ICD-9-CM   1. Family H/O CAD Z82.49 V17.3   2. Mild Hypertriglyceridemia E78.1 272.1   3. Hypertension  I10 401.9   4. Severe Hypercholesterolemia With Low HDL E78.00 272.0   5. Aortic Atherosclerosis I70.0 440.0     Problem List Items Addressed This Visit     Severe Hypercholesterolemia With Low HDL    Mild Hypertriglyceridemia    Hypertension    Family H/O CAD - Primary    Aortic Atherosclerosis           Plan:           Return to clinic 9 months   Low level/low impact aerobic exercise 5x's/wk. Heart healthy diet and risk factor modification.    See labs and med orders.     See labs and med orders.  Follow lipids. Stress test if develops sx    Portions of this note may have been created with voice recognition software.  Grammatical, syntax and spelling errors may be inevitable.

## 2019-07-20 DIAGNOSIS — R33.9 URINARY RETENTION: ICD-10-CM

## 2019-07-22 RX ORDER — TAMSULOSIN HYDROCHLORIDE 0.4 MG/1
CAPSULE ORAL
Qty: 180 CAPSULE | Refills: 3 | Status: SHIPPED | OUTPATIENT
Start: 2019-07-22 | End: 2020-07-10 | Stop reason: SDUPTHER

## 2020-07-10 ENCOUNTER — OFFICE VISIT (OUTPATIENT)
Dept: UROLOGY | Facility: CLINIC | Age: 75
End: 2020-07-10
Payer: MEDICARE

## 2020-07-10 ENCOUNTER — LAB VISIT (OUTPATIENT)
Dept: LAB | Facility: HOSPITAL | Age: 75
End: 2020-07-10
Attending: UROLOGY
Payer: MEDICARE

## 2020-07-10 VITALS
SYSTOLIC BLOOD PRESSURE: 98 MMHG | HEIGHT: 71 IN | HEART RATE: 57 BPM | DIASTOLIC BLOOD PRESSURE: 53 MMHG | BODY MASS INDEX: 24.47 KG/M2 | WEIGHT: 174.81 LBS

## 2020-07-10 DIAGNOSIS — Z12.5 SCREENING FOR PROSTATE CANCER: ICD-10-CM

## 2020-07-10 DIAGNOSIS — N40.1 ENLARGED PROSTATE WITH URINARY OBSTRUCTION: ICD-10-CM

## 2020-07-10 DIAGNOSIS — N13.8 ENLARGED PROSTATE WITH URINARY OBSTRUCTION: ICD-10-CM

## 2020-07-10 DIAGNOSIS — R33.9 URINARY RETENTION: ICD-10-CM

## 2020-07-10 DIAGNOSIS — Z12.5 SCREENING FOR PROSTATE CANCER: Primary | ICD-10-CM

## 2020-07-10 LAB
BILIRUB SERPL-MCNC: NORMAL MG/DL
BLOOD URINE, POC: NORMAL
CLARITY, POC UA: CLEAR
COLOR, POC UA: YELLOW
GLUCOSE UR QL STRIP: NORMAL
KETONES UR QL STRIP: NORMAL
LEUKOCYTE ESTERASE URINE, POC: NORMAL
NITRITE, POC UA: NORMAL
PH, POC UA: 5
POC RESIDUAL URINE VOLUME: 57 ML (ref 0–100)
PROTEIN, POC: NORMAL
SPECIFIC GRAVITY, POC UA: >1.03
UROBILINOGEN, POC UA: NORMAL

## 2020-07-10 PROCEDURE — 1126F PR PAIN SEVERITY QUANTIFIED, NO PAIN PRESENT: ICD-10-PCS | Mod: S$GLB,,, | Performed by: UROLOGY

## 2020-07-10 PROCEDURE — 84153 ASSAY OF PSA TOTAL: CPT

## 2020-07-10 PROCEDURE — 1101F PT FALLS ASSESS-DOCD LE1/YR: CPT | Mod: CPTII,S$GLB,, | Performed by: UROLOGY

## 2020-07-10 PROCEDURE — 3074F PR MOST RECENT SYSTOLIC BLOOD PRESSURE < 130 MM HG: ICD-10-PCS | Mod: CPTII,S$GLB,, | Performed by: UROLOGY

## 2020-07-10 PROCEDURE — 99999 PR PBB SHADOW E&M-EST. PATIENT-LVL III: CPT | Mod: PBBFAC,,, | Performed by: UROLOGY

## 2020-07-10 PROCEDURE — 81002 URINALYSIS NONAUTO W/O SCOPE: CPT | Mod: S$GLB,,, | Performed by: UROLOGY

## 2020-07-10 PROCEDURE — 1101F PR PT FALLS ASSESS DOC 0-1 FALLS W/OUT INJ PAST YR: ICD-10-PCS | Mod: CPTII,S$GLB,, | Performed by: UROLOGY

## 2020-07-10 PROCEDURE — 3008F BODY MASS INDEX DOCD: CPT | Mod: CPTII,S$GLB,, | Performed by: UROLOGY

## 2020-07-10 PROCEDURE — 99999 PR PBB SHADOW E&M-EST. PATIENT-LVL III: ICD-10-PCS | Mod: PBBFAC,,, | Performed by: UROLOGY

## 2020-07-10 PROCEDURE — 99214 OFFICE O/P EST MOD 30 MIN: CPT | Mod: 25,S$GLB,, | Performed by: UROLOGY

## 2020-07-10 PROCEDURE — 81002 POCT URINE DIPSTICK WITHOUT MICROSCOPE: ICD-10-PCS | Mod: S$GLB,,, | Performed by: UROLOGY

## 2020-07-10 PROCEDURE — 3074F SYST BP LT 130 MM HG: CPT | Mod: CPTII,S$GLB,, | Performed by: UROLOGY

## 2020-07-10 PROCEDURE — 99214 PR OFFICE/OUTPT VISIT, EST, LEVL IV, 30-39 MIN: ICD-10-PCS | Mod: 25,S$GLB,, | Performed by: UROLOGY

## 2020-07-10 PROCEDURE — 51798 POCT BLADDER SCAN: ICD-10-PCS | Mod: S$GLB,,, | Performed by: UROLOGY

## 2020-07-10 PROCEDURE — 3008F PR BODY MASS INDEX (BMI) DOCUMENTED: ICD-10-PCS | Mod: CPTII,S$GLB,, | Performed by: UROLOGY

## 2020-07-10 PROCEDURE — 1126F AMNT PAIN NOTED NONE PRSNT: CPT | Mod: S$GLB,,, | Performed by: UROLOGY

## 2020-07-10 PROCEDURE — 3078F PR MOST RECENT DIASTOLIC BLOOD PRESSURE < 80 MM HG: ICD-10-PCS | Mod: CPTII,S$GLB,, | Performed by: UROLOGY

## 2020-07-10 PROCEDURE — 3078F DIAST BP <80 MM HG: CPT | Mod: CPTII,S$GLB,, | Performed by: UROLOGY

## 2020-07-10 PROCEDURE — 51798 US URINE CAPACITY MEASURE: CPT | Mod: S$GLB,,, | Performed by: UROLOGY

## 2020-07-10 PROCEDURE — 1159F PR MEDICATION LIST DOCUMENTED IN MEDICAL RECORD: ICD-10-PCS | Mod: S$GLB,,, | Performed by: UROLOGY

## 2020-07-10 PROCEDURE — 36415 COLL VENOUS BLD VENIPUNCTURE: CPT | Mod: PO

## 2020-07-10 PROCEDURE — 1159F MED LIST DOCD IN RCRD: CPT | Mod: S$GLB,,, | Performed by: UROLOGY

## 2020-07-10 RX ORDER — TAMSULOSIN HYDROCHLORIDE 0.4 MG/1
2 CAPSULE ORAL DAILY
Qty: 180 CAPSULE | Refills: 3 | Status: SHIPPED | OUTPATIENT
Start: 2020-07-10 | End: 2021-07-01 | Stop reason: SDUPTHER

## 2020-07-10 NOTE — PROGRESS NOTES
Subjective:       Patient ID: Stas Matias Jr. is a 74 y.o. male.    Chief Complaint: Follow-up    HPI     89 year old with elevated PSA.  He underwent benign prostate needle biopsy 08/2017 and his PSA was 7.9 at that time.  He also has a history of urinary retention.  He is taking Flomax, two daily.  His voiding symptoms are improved and he has no complaints today.  He denies hematuria and dysuria.  We previously discussed surgical treatment options for BPH but he is overall satisfied for now.  He has a family history of prostate cancer.  Father had rad. prostatectomy.  His most recent PSA is decreased to 3.7.  Urine dipstick shows negative for all components.  PVR 57 ml      Component PSA, SCREEN   Latest Ref Rng & Units 0.00 - 4.00 ng/mL   9/4/2018 3.7   2/7/2018 6.1   8/9/2017 7.9   2/24/2017 4.2   1/11/2016 4.5   4/4/2014 2.3   3/27/2013 2.45         Review of Systems   Constitutional: Negative for fever.   Genitourinary: Negative for dysuria and hematuria.       Objective:      Physical Exam  Vitals signs reviewed.   Constitutional:       Appearance: He is well-developed.   HENT:      Head: Normocephalic and atraumatic.   Eyes:      Conjunctiva/sclera: Conjunctivae normal.   Cardiovascular:      Rate and Rhythm: Normal rate.   Pulmonary:      Effort: Pulmonary effort is normal.   Genitourinary:     Prostate: Enlarged (50g, s/s/a). Not tender.      Rectum: No mass. Normal anal tone.   Musculoskeletal: Normal range of motion.   Skin:     General: Skin is warm and dry.      Findings: No rash.   Neurological:      Mental Status: He is alert and oriented to person, place, and time.         Assessment:       1. Screening for prostate cancer    2. Urinary retention    3. Enlarged prostate with urinary obstruction        Plan:       Screening for prostate cancer  -     PSA, Screening; Future; Expected date: 07/10/2020    Urinary retention  -     tamsulosin (FLOMAX) 0.4 mg Cap; Take 2 capsules (0.8 mg total) by  mouth once daily.  Dispense: 180 capsule; Refill: 3  -     POCT URINE DIPSTICK WITHOUT MICROSCOPE  -     POCT Bladder Scan    Enlarged prostate with urinary obstruction      continue Flomax.  Follow-up 1 year

## 2020-07-11 LAB — COMPLEXED PSA SERPL-MCNC: 4.2 NG/ML (ref 0–4)

## 2021-07-01 DIAGNOSIS — R33.9 URINARY RETENTION: ICD-10-CM

## 2021-07-01 RX ORDER — TAMSULOSIN HYDROCHLORIDE 0.4 MG/1
2 CAPSULE ORAL DAILY
Qty: 180 CAPSULE | Refills: 3 | Status: SHIPPED | OUTPATIENT
Start: 2021-07-01 | End: 2022-06-28

## 2023-07-23 DIAGNOSIS — R33.9 URINARY RETENTION: ICD-10-CM

## 2023-07-24 RX ORDER — TAMSULOSIN HYDROCHLORIDE 0.4 MG/1
CAPSULE ORAL
Qty: 180 CAPSULE | Refills: 3 | Status: SHIPPED | OUTPATIENT
Start: 2023-07-24

## (undated) DEVICE — SCRUB 10% POVIDONE IODINE 4OZ

## (undated) DEVICE — PAD PREP 50/CA

## (undated) DEVICE — SEE MEDLINE ITEM 146362

## (undated) DEVICE — FORMALIN 60ML PREFILLED CONT

## (undated) DEVICE — NDL SPINAL 22GX7 SPINOCAN

## (undated) DEVICE — SOL IRR STRL WATER 500ML

## (undated) DEVICE — COVER TRANSDUCER LATEX N/STERI

## (undated) DEVICE — GAUZE SPONGE 4'X4' 8PLY NS

## (undated) DEVICE — SYR LUER LOCK TIP 6CC

## (undated) DEVICE — SEE MEDLINE ITEM 157128

## (undated) DEVICE — NEEDLE HYPO 18X1.5 SG

## (undated) DEVICE — SYR 10CC LUER LOCK

## (undated) DEVICE — NDL TISSUE BIOPSY MAGNUM

## (undated) DEVICE — GLOVE BIOGEL ECLIPSE SZ 7.5